# Patient Record
Sex: FEMALE | Race: WHITE | URBAN - METROPOLITAN AREA
[De-identification: names, ages, dates, MRNs, and addresses within clinical notes are randomized per-mention and may not be internally consistent; named-entity substitution may affect disease eponyms.]

---

## 2017-04-07 ENCOUNTER — TRANSFERRED RECORDS (OUTPATIENT)
Dept: HEALTH INFORMATION MANAGEMENT | Facility: CLINIC | Age: 62
End: 2017-04-07

## 2017-05-10 ENCOUNTER — TRANSFERRED RECORDS (OUTPATIENT)
Dept: HEALTH INFORMATION MANAGEMENT | Facility: CLINIC | Age: 62
End: 2017-05-10

## 2017-05-24 ENCOUNTER — TRANSFERRED RECORDS (OUTPATIENT)
Dept: HEALTH INFORMATION MANAGEMENT | Facility: CLINIC | Age: 62
End: 2017-05-24

## 2017-05-26 ENCOUNTER — TRANSFERRED RECORDS (OUTPATIENT)
Dept: HEALTH INFORMATION MANAGEMENT | Facility: CLINIC | Age: 62
End: 2017-05-26

## 2017-06-26 ENCOUNTER — TRANSFERRED RECORDS (OUTPATIENT)
Dept: HEALTH INFORMATION MANAGEMENT | Facility: CLINIC | Age: 62
End: 2017-06-26

## 2017-06-27 ENCOUNTER — CARE COORDINATION (OUTPATIENT)
Dept: TRANSPLANT | Facility: CLINIC | Age: 62
End: 2017-06-27

## 2017-07-06 ENCOUNTER — OFFICE VISIT (OUTPATIENT)
Dept: TRANSPLANT | Facility: CLINIC | Age: 62
End: 2017-07-06
Attending: INTERNAL MEDICINE
Payer: COMMERCIAL

## 2017-07-06 ENCOUNTER — MEDICAL CORRESPONDENCE (OUTPATIENT)
Dept: TRANSPLANT | Facility: CLINIC | Age: 62
End: 2017-07-06

## 2017-07-06 VITALS
TEMPERATURE: 98.5 F | HEIGHT: 61 IN | SYSTOLIC BLOOD PRESSURE: 121 MMHG | HEART RATE: 81 BPM | BODY MASS INDEX: 34.19 KG/M2 | DIASTOLIC BLOOD PRESSURE: 78 MMHG | WEIGHT: 181.1 LBS | RESPIRATION RATE: 18 BRPM | OXYGEN SATURATION: 96 %

## 2017-07-06 DIAGNOSIS — C85.90 NHL (NON-HODGKIN'S LYMPHOMA) (H): Primary | ICD-10-CM

## 2017-07-06 DIAGNOSIS — C83.18 LYMPHOMA, MANTLE CELL, MULTIPLE SITES (H): Primary | ICD-10-CM

## 2017-07-06 DIAGNOSIS — Z71.9 ENCOUNTER FOR COUNSELING: Primary | ICD-10-CM

## 2017-07-06 PROCEDURE — 40000268 ZZH STATISTIC NO CHARGES: Mod: ZF

## 2017-07-06 PROCEDURE — 99213 OFFICE O/P EST LOW 20 MIN: CPT | Mod: ZF

## 2017-07-06 RX ORDER — MULTIPLE VITAMINS W/ MINERALS TAB 9MG-400MCG
1 TAB ORAL DAILY PRN
COMMUNITY

## 2017-07-06 RX ORDER — LORATADINE 10 MG/1
10 TABLET ORAL DAILY
COMMUNITY

## 2017-07-06 ASSESSMENT — PAIN SCALES - GENERAL: PAINLEVEL: NO PAIN (0)

## 2017-07-06 NOTE — LETTER
7/6/2017       RE: Julee Ralph  [address]  [facility] [address]     Dear Colleague,    Thank you for referring your patient, Julee Ralph, to the [facility] BLOOD AND MARROW TRANSPLANT. Please see a copy of my visit note below.    Reason for consult: Referral by Dr. Tovar for diagnosis of mantle cell lymphoma in consideration for autologous stem cell transplantation.  History of present illness and review of the symptoms:  Ms. Ralph is a very pleasant 61-year-old female patient with prior history of hypertension, dyslipidemia, obesity, was in her usual state of health up until March 2017 when she developed progressive sore throat along with fullness and palpable lymphadenopathy in her neck.  She was referred to to be evaluated by ENT and was found to have enlarged tonsils for which she underwent tonsillectomy  With outside hospital pathology revealing mantle cell lymphoma/blastoid variant (over 90% Ki67 index).  Per available to me from Dr. Tovar the patient underwent CT scan of the neck on 4/11/17 which demonstrated grade 3 hypertrophy of the right tonsil and great for hypertrophy of the left tonsil. Multiple enlarged cervical lymph nodes were described bilaterally largest in level IIA  And to be measuring slightly over 2 cm in diameter.  There are also multiple other smaller enlarged lymph nodes (level III in level IV).  IHC from her initial biopsy demonstrated strong expression of CD20, cyclin D1; Ki-67 index over 95%; TP53 and c-myc were reportedly negative.  Her diagnostic labs performed on May 18, 2017 demonstrated WBCs of 7.0, hemoglobin 14.5, platelets 340 with normal beta-2 microglobulin and normal LDH.her diagnostic PET/CT scan performed on 5/24/2017 demonstrated once again soft tissue thickening in the nasopharynx with SUV of 10.2.  FDG avid lymphadenopathy was also describedmore so on the left side of her neck with corresponding cervical lymphadenopathy involving levels two, three, four and  "SUVs maxing out at 9.9..  No hypermetabolic activity was detected in chest, abdomen or pelvis.  No skeletal lesions are identified.  The patient underwent diagnostic LP on 2017 was no history evidence of involvement of her CNS by mantle cell lymphoma.  Her diagnostic echocardiogram on 2017 demonstrated ejection fraction of 77% with no wall motion abnormalities.  The patient started therapy with R CHOP alternating with RDHAP.  She has received so far one cycle each  Combination chemotherapy and has tolerated it fairly well to date except from brief period of bradycardia which recent administration of RDHAP potentially attributed to her supportive care medications. Per available records her cytarabine was partially held.  She currently reports feeling well overall with no recent fevers, chills, drenching night sweats. Also prior to her diagnosis the patient reported no constitutional symptoms including no significant fatigue. Essentially her symptoms were limited to soreness in her throat and cervical lymphadenopathy.    Past medical history:  -Hypertension  -Dyslipidemia  -Obesity  -Mantle cell lymphoma,    Past surgical history:  -Hysterectomy  -Tonsillectomy    Social history:  -Former smoker (smoked over half a pack per day for 30 years and quit about eight years ago).  -Social alcohol use  -Worsening office as   -No children  2 siblings (two sisters in their 60s one of whom has history of lupus and rheumatoid arthritis).    Medications:  -Losartan/hydrochlorothiazide  -Lipitor  -Multivitamins including vitamin D    Physical examination:  /78 (BP Location: Left arm, Patient Position: Sitting, Cuff Size: Adult Regular)  Pulse 81  Temp 98.5  F (36.9  C) (Oral)  Resp 18  Ht 1.553 m (5' 1.14\")  Wt 82.1 kg (181 lb 1.6 oz)  SpO2 96%  BMI 34.06 kg/m2  ECO  Gen.: Alert and oriented not in acute distress; well nourished and hydrated.  HEENT:  moist membranes with no ulcerations " or thrush  No palpable cervical adenopathy  Lungs: Clear  Bilaterally  Cardiovascular: Regular rate and rhythm with no murmurs  Abdomen: Soft nontender nondistended; audible bowel sounds  Extremities: No edema  Skin: No rashes  Neurologic:  Grossly nonfocal    Labs:  Reviewed outside hospital records  Estimated MIPI  Score at time of diagnosis ~ 7.7 /high risk    Imaging: see HPI  Bx: see HPI  review at [facility] pend     A/P: 62 yo female pt with newly diagnosed MCL referred for initial consultation in consideration for stem cell transportation in upfront management of her disease.    -Mantle cell lymphoma: We discussed with the patient in detail the nature history of mantle cell lymphoma with particular focus on the upfront therapy including couple of the symptoms of transportation for patient to achieve first complete remission.  The patient appears to be clinically responding to therapy delivered to date. I recommend continuing with upfront therapy for now to complete a total of six cycles of therapy (Blood. 2013 Gino 3;121(1):48-53. doi: 10.1182/[id number]. Epub 2012 Jun 20. CHOP and DHAP plus rituximab followed by autologous stem cell transplantation in mantle cell lymphoma: a phase 2 study from the [employer]. Kenton R1, Dontae C, Indu V, Calvin P, Juan Pablo A, Salena H, Sven G, Van Jm A, Merlene O, Bruno N, Myranda F, Eloise O; [employer] (MARY) alternating RCHOP with RDHAP in her case. Should the patient achieved complete remission as evidenced by end of therapy PET/CT scan I recommend proceeding with high-dose chemotherapy (conditioning with BEAM) followed by autologous stem cell transplantation at the [facility]. ASCT continues to represent a gold standard in younger fit pts with MCL who are able to achieve CR1. We then reviewed with patient in detail major logistics of stem cell transplant including but not limited to stem cell  collection, administration of high-dose chemotherapy and major posttransplant complications such as mucositis, infections, major organ toxicities from high-dose chemotherapy, nausea vomiting diarrhea, pancytopenia along with late complications of transplant. The patient  had multiple questions all of which were answered to the best of my ability.  I also recommended giving consideration to Rituxan maintenance posttransplant.  The patient will continue with her ongoing chemotherapy under supervision of Dr. Tovar. She expressed interest in proceeding with ASCT should she achieve first complete remission and will be glad to support her throughout the transplant process.  The patient also had an opportunity to meet with my nurse coordinator and  to discuss further the logistics of transplant.    I spent over 60% of this over an hour encounter in counseling care coordinator reviewing her prior history, ongoing care and future transplant plans as outlined above.    Alexsander Santamaria MD PhD  Hematology, Oncology and Transplantation  [facility]  ------------------------------------------------------------------------------------------------------------------  This note was created with the use of a speech recognition software occasionally resulting in unintended misspelling errors despite my best efforts to detect and correct those.       Again, thank you for allowing me to participate in the care of your patient.      Sincerely,    Alexsander Santamaria MD

## 2017-07-06 NOTE — PROGRESS NOTES
Met with pt and spouse, Cullen, to discuss follow up plan. Pt and spouse verbalized understanding of plan. She was given Dr Santamaria and BMT admin office contact info.

## 2017-07-06 NOTE — MR AVS SNAPSHOT
"              After Visit Summary   7/6/2017    Julee Ralph    MRN: [id number]           Patient Information     Date Of Birth          1955        Visit Information        Provider Department      7/6/2017 11:30 AM Alexsander Santamaria MD [facility] Blood and Marrow Transplant        Today's Diagnoses     Lymphoma, mantle cell, multiple sites (H)    -  1          [facility] ([facility]  [address] [place] [address]  Phone: [phone]  Clinic Hours:   Monday-Thursday:7am to 7pm   Friday: 7am to 5pm   Weekends and holidays:    8am to noon (in general)  If your fever is 100.5  or greater,   call the clinic.  After hours call the   hospital at [phone] or   [phone]. Ask for the BMT   fellow on-call            Follow-ups after your visit        Who to contact     If you have questions or need follow up information about today's clinic visit or your schedule please contact [facility] BLOOD AND MARROW TRANSPLANT directly at [phone].  Normal or non-critical lab and imaging results will be communicated to you by [employer], letter or phone within 4 business days after the clinic has received the results. If you do not hear from us within 7 days, please contact the clinic through [employer] or phone. If you have a critical or abnormal lab result, we will notify you by phone as soon as possible.  Submit refill requests through [employer] or call your pharmacy and they will forward the refill request to us. Please allow 3 business days for your refill to be completed.          Additional Information About Your Visit        [employer] Information     [employer] lets you send messages to your doctor, view your test results, renew your prescriptions, schedule appointments and more. To sign up, go to [email or web address] . Click on \"Log in\" on the left side of the screen, which will take you to the Welcome page. Then click on \"Sign up Now\" on the right side of the page.     You will be asked to " "enter the access code listed below, as well as some personal information. Please follow the directions to create your username and password.     Your access code is: [email or web address]  Expires: 2017  2:26 PM     Your access code will  in 90 days. If you need help or a new code, please call your [facility] clinic or [phone].        Care EveryWhere ID     This is your Care EveryWhere ID. This could be used by other organizations to access your [facility] medical records  [id number]        Your Vitals Were     Pulse Temperature Respirations Height Pulse Oximetry BMI (Body Mass Index)    81 98.5  F (36.9  C) (Oral) 18 1.553 m (5' 1.14\") 96% 34.06 kg/m2       Blood Pressure from Last 3 Encounters:   17 121/78    Weight from Last 3 Encounters:   17 82.1 kg (181 lb 1.6 oz)              Today, you had the following     No orders found for display       Recent Review Flowsheet Data     There is no flowsheet data to display.               Primary Care Provider    None Specified       No primary provider on file.        Equal Access to Services     [facility] : Hadrashmi Miller, gamaliel augustin, dallas starr . So [facility] [phone].    ATENCIÓN: Si habla español, tiene a dexter disposición servicios gratuitos de asistencia lingüística. Yuriame al [phone].    We comply with applicable federal civil rights laws and [place] laws. We do not discriminate on the basis of race, color, national origin, age, disability sex, sexual orientation or gender identity.            Thank you!     Thank you for choosing [facility] BLOOD AND MARROW TRANSPLANT  for your care. Our goal is always to provide you with excellent care. Hearing back from our patients is one way we can continue to improve our services. Please take a few minutes to complete the written survey that you may receive in the mail after your visit with us. Thank you!             Your Updated " Medication List - Protect others around you: Learn how to safely use, store and throw away your medicines at www.disposemymeds.org.          This list is accurate as of: 7/6/17 11:59 PM.  Always use your most recent med list.                   Brand Name Dispense Instructions for use Diagnosis    LIPITOR PO      Take 10 mg by mouth every evening        loratadine 10 MG tablet    CLARITIN     Take 10 mg by mouth daily        LOSARTAN POTASSIUM PO      Take 12.5 mg by mouth daily        Multi-vitamin Tabs tablet      Take 1 tablet by mouth daily as needed        VITAMIN D (CHOLECALCIFEROL) PO      Take 2,000 Units by mouth daily

## 2017-07-06 NOTE — PROGRESS NOTES
Reason for consult: Referral by Dr. Tovar for diagnosis of mantle cell lymphoma in consideration for autologous stem cell transplantation.  History of present illness and review of the symptoms:  Ms. Ralph is a very pleasant 61-year-old female patient with prior history of hypertension, dyslipidemia, obesity, was in her usual state of health up until March 2017 when she developed progressive sore throat along with fullness and palpable lymphadenopathy in her neck.  She was referred to to be evaluated by ENT and was found to have enlarged tonsils for which she underwent tonsillectomy  With outside hospital pathology revealing mantle cell lymphoma/blastoid variant (over 90% Ki67 index).  Per available to me from Dr. Tovar the patient underwent CT scan of the neck on 4/11/17 which demonstrated grade 3 hypertrophy of the right tonsil and great for hypertrophy of the left tonsil. Multiple enlarged cervical lymph nodes were described bilaterally largest in level IIA  And to be measuring slightly over 2 cm in diameter.  There are also multiple other smaller enlarged lymph nodes (level III in level IV).  IHC from her initial biopsy demonstrated strong expression of CD20, cyclin D1; Ki-67 index over 95%; TP53 and c-myc were reportedly negative.  Her diagnostic labs performed on May 18, 2017 demonstrated WBCs of 7.0, hemoglobin 14.5, platelets 340 with normal beta-2 microglobulin and normal LDH.her diagnostic PET/CT scan performed on 5/24/2017 demonstrated once again soft tissue thickening in the nasopharynx with SUV of 10.2.  FDG avid lymphadenopathy was also describedmore so on the left side of her neck with corresponding cervical lymphadenopathy involving levels two, three, four and SUVs maxing out at 9.9..  No hypermetabolic activity was detected in chest, abdomen or pelvis.  No skeletal lesions are identified.  The patient underwent diagnostic LP on 5/26/2017 was no history evidence of involvement of her CNS by  "mantle cell lymphoma.  Her diagnostic echocardiogram on 2017 demonstrated ejection fraction of 77% with no wall motion abnormalities.  The patient started therapy with R CHOP alternating with RDHAP.  She has received so far one cycle each  Combination chemotherapy and has tolerated it fairly well to date except from brief period of bradycardia which recent administration of RDHAP potentially attributed to her supportive care medications. Per available records her cytarabine was partially held.  She currently reports feeling well overall with no recent fevers, chills, drenching night sweats. Also prior to her diagnosis the patient reported no constitutional symptoms including no significant fatigue. Essentially her symptoms were limited to soreness in her throat and cervical lymphadenopathy.    Past medical history:  -Hypertension  -Dyslipidemia  -Obesity  -Mantle cell lymphoma,    Past surgical history:  -Hysterectomy  -Tonsillectomy    Social history:  -Former smoker (smoked over half a pack per day for 30 years and quit about eight years ago).  -Social alcohol use  -Worsening office as   -No children  2 siblings (two sisters in their 60s one of whom has history of lupus and rheumatoid arthritis).    Medications:  -Losartan/hydrochlorothiazide  -Lipitor  -Multivitamins including vitamin D    Physical examination:  /78 (BP Location: Left arm, Patient Position: Sitting, Cuff Size: Adult Regular)  Pulse 81  Temp 98.5  F (36.9  C) (Oral)  Resp 18  Ht 1.553 m (5' 1.14\")  Wt 82.1 kg (181 lb 1.6 oz)  SpO2 96%  BMI 34.06 kg/m2  ECO  Gen.: Alert and oriented not in acute distress; well nourished and hydrated.  HEENT:  moist membranes with no ulcerations or thrush  No palpable cervical adenopathy  Lungs: Clear  Bilaterally  Cardiovascular: Regular rate and rhythm with no murmurs  Abdomen: Soft nontender nondistended; audible bowel sounds  Extremities: No edema  Skin: No " rashes  Neurologic:  Grossly nonfocal    Labs:  Reviewed outside hospital records  Estimated MIPI  Score at time of diagnosis ~ 7.7 /high risk    Imaging: see HPI  Bx: see HPI  review at [facility] pend     A/P: 62 yo female pt with newly diagnosed MCL referred for initial consultation in consideration for stem cell transportation in upfront management of her disease.    -Mantle cell lymphoma: We discussed with the patient in detail the nature history of mantle cell lymphoma with particular focus on the upfront therapy including couple of the symptoms of transportation for patient to achieve first complete remission.  The patient appears to be clinically responding to therapy delivered to date. I recommend continuing with upfront therapy for now to complete a total of six cycles of therapy (Blood. 2013 Gino 3;121(1):48-53. doi: 10.1182/[id number]. Epub 2012 Jun 20. CHOP and DHAP plus rituximab followed by autologous stem cell transplantation in mantle cell lymphoma: a phase 2 study from the [employer]. Kenton R1, Dontae C, Indu V, Calvin P, Juan Pablo A, Salena H, Sven G, Van Jm A, Merlene O, Bruno N, Myranda F, Eloise O; [employer] Lymphomes de l'Adulte (MARY) alternating RCHOP with RDHAP in her case. Should the patient achieved complete remission as evidenced by end of therapy PET/CT scan I recommend proceeding with high-dose chemotherapy (conditioning with BEAM) followed by autologous stem cell transplantation at [facility]. ASCT continues to represent a gold standard in younger fit pts with MCL who are able to achieve CR1. We then reviewed with patient in detail major logistics of stem cell transplant including but not limited to stem cell collection, administration of high-dose chemotherapy and major posttransplant complications such as mucositis, infections, major organ toxicities from high-dose chemotherapy, nausea vomiting diarrhea, pancytopenia along with late  complications of transplant. The patient  had multiple questions all of which were answered to the best of my ability.  I also recommended giving consideration to Rituxan maintenance posttransplant.  The patient will continue with her ongoing chemotherapy under supervision of Dr. Tovar. She expressed interest in proceeding with ASCT should she achieve first complete remission and will be glad to support her throughout the transplant process.  The patient also had an opportunity to meet with my nurse coordinator and  to discuss further the logistics of transplant.    I spent over 60% of this over an hour encounter in counseling care coordinator reviewing her prior history, ongoing care and future transplant plans as outlined above.    Alexsander Santamaria MD PhD  Hematology, Oncology and Transplantation  [facility]  ------------------------------------------------------------------------------------------------------------------  This note was created with the use of a speech recognition software occasionally resulting in unintended misspelling errors despite my best efforts to detect and correct those.

## 2017-07-06 NOTE — MR AVS SNAPSHOT
"              After Visit Summary   7/6/2017    Julee Ralph    MRN: [id number]           Patient Information     Date Of Birth          1955        Visit Information        Provider Department      7/6/2017 1:30 PM Susan Zepeda MSW;  2 [address] [facility] Blood and Marrow Transplant        Today's Diagnoses     Encounter for counseling    -  1          [facility] ([facility])  [address] [place] [address]  Phone: [phone]  Clinic Hours:   Monday-Thursday:7am to 7pm   Friday: 7am to 5pm   Weekends and holidays:    8am to noon (in general)  If your fever is 100.5  or greater,   call the clinic.  After hours call the   hospital at [phone] or   [phone]. Ask for the BMT   fellow on-call            Follow-ups after your visit        Who to contact     If you have questions or need follow up information about today's clinic visit or your schedule please contact [facility] BLOOD AND MARROW TRANSPLANT directly at [phone].  Normal or non-critical lab and imaging results will be communicated to you by [employer], letter or phone within 4 business days after the clinic has received the results. If you do not hear from us within 7 days, please contact the clinic through "[employer]"t or phone. If you have a critical or abnormal lab result, we will notify you by phone as soon as possible.  Submit refill requests through [employer] or call your pharmacy and they will forward the refill request to us. Please allow 3 business days for your refill to be completed.          Additional Information About Your Visit        [employer] Information     [employer] lets you send messages to your doctor, view your test results, renew your prescriptions, schedule appointments and more. To sign up, go to [email or web address] . Click on \"Log in\" on the left side of the screen, which will take you to the Welcome page. Then click on \"Sign up Now\" on the right side of the page.     You will be asked " to enter the access code listed below, as well as some personal information. Please follow the directions to create your username and password.     Your access code is: [email or web address]  Expires: 2017  2:26 PM     Your access code will  in 90 days. If you need help or a new code, please call your [facility] clinic or [phone].        Care EveryWhere ID     This is your Care EveryWhere ID. This could be used by other organizations to access your [facility] medical records  [id number]         Blood Pressure from Last 3 Encounters:   17 121/78    Weight from Last 3 Encounters:   17 82.1 kg (181 lb 1.6 oz)              Today, you had the following     No orders found for display       Recent Review Flowsheet Data     There is no flowsheet data to display.               Primary Care Provider    None Specified       No primary provider on file.        Equal Access to Services     [facility]: Hadrashmi Miller, gamaliel augustin, doe minaya, dallas collado . So [facility] [phone].    ATENCIÓN: Si habla español, tiene a dexter disposición servicios gratuitos de asistencia lingüística. Llame al [phone].    We comply with applicable federal civil rights laws and [place] laws. We do not discriminate on the basis of race, color, national origin, age, disability sex, sexual orientation or gender identity.            Thank you!     Thank you for choosing [facility] BLOOD AND MARROW TRANSPLANT  for your care. Our goal is always to provide you with excellent care. Hearing back from our patients is one way we can continue to improve our services. Please take a few minutes to complete the written survey that you may receive in the mail after your visit with us. Thank you!             Your Updated Medication List - Protect others around you: Learn how to safely use, store and throw away your medicines at www.disposemymeds.org.          This list is accurate as  of: 7/6/17 11:59 PM.  Always use your most recent med list.                   Brand Name Dispense Instructions for use Diagnosis    LIPITOR PO      Take 10 mg by mouth every evening        loratadine 10 MG tablet    CLARITIN     Take 10 mg by mouth daily        LOSARTAN POTASSIUM PO      Take 12.5 mg by mouth daily        Multi-vitamin Tabs tablet      Take 1 tablet by mouth daily as needed        VITAMIN D (CHOLECALCIFEROL) PO      Take 2,000 Units by mouth daily

## 2017-07-06 NOTE — NURSING NOTE
"Oncology Rooming Note    July 6, 2017 11:46 AM   Julee Ralph is a 61 year old female who presents for:    Chief Complaint   Patient presents with     Oncology Clinic Visit     Mantle cell lymphoma - New     Initial Vitals: /78 (BP Location: Left arm, Patient Position: Sitting, Cuff Size: Adult Regular)  Pulse 81  Temp 98.5  F (36.9  C) (Oral)  Resp 18  Ht 1.553 m (5' 1.14\")  Wt 82.1 kg (181 lb 1.6 oz)  SpO2 96%  BMI 34.06 kg/m2 Estimated body mass index is 34.06 kg/(m^2) as calculated from the following:    Height as of this encounter: 1.553 m (5' 1.14\").    Weight as of this encounter: 82.1 kg (181 lb 1.6 oz). Body surface area is 1.88 meters squared.  No Pain (0) Comment: Data Unavailable   No LMP recorded. Patient has had a hysterectomy.  Allergies reviewed: Yes  Medications reviewed: Yes    Medications: Medication refills not needed today.  Pharmacy name entered into [employer] [address] [place] [address] HWY AT [facility] [address]    Clinical concerns: Patient here to see provider as new patient related to Mantle cell lymphoma    15 minutes for nursing intake (face to face time)     Wing Mcmahon LPN            "

## 2017-07-06 NOTE — MR AVS SNAPSHOT
After Visit Summary   7/6/2017    Julee Ralph    MRN: [id number]           Patient Information     Date Of Birth          1955        Visit Information        Provider Department      7/6/2017 1:00 PM Coordinator, [facility] Bmt Nurse;  2 114 CONSULT [facility] Blood and Marrow Transplant        Today's Diagnoses     NHL (non-Hodgkin's lymphoma) (H)    -  1          [facility] ([facility])  [address] [place] [address]  Phone: [phone]  Clinic Hours:   Monday-Thursday:7am to 7pm   Friday: 7am to 5pm   Weekends and holidays:    8am to noon (in general)  If your fever is 100.5  or greater,   call the clinic.  After hours call the   hospital at [phone] or   [phone]. Ask for the BMT   fellow on-call            Follow-ups after your visit        Your next 10 appointments already scheduled     Jul 06, 2017  1:30 PM CDT   (Arrive by 1:15 PM)   BMT OLLY VELEZ with JERI Berger,  2 114 CONSULT [facility] Blood and Marrow Transplant ([facility] [place] [facility])    [address] [place] [id number] [phone]              Who to contact     If you have questions or need follow up information about today's clinic visit or your schedule please contact [facility] BLOOD AND MARROW TRANSPLANT directly at [phone].  Normal or non-critical lab and imaging results will be communicated to you by MyChart, letter or phone within 4 business days after the clinic has received the results. If you do not hear from us within 7 days, please contact the clinic through MyChart or phone. If you have a critical or abnormal lab result, we will notify you by phone as soon as possible.  Submit refill requests through [employer] or call your pharmacy and they will forward the refill request to us. Please allow 3 business days for your refill to be completed.          Additional Information About Your Visit        MyChart Information      "[employer] lets you send messages to your doctor, view your test results, renew your prescriptions, schedule appointments and more. To sign up, go to [email or web address] . Click on \"Log in\" on the left side of the screen, which will take you to the Welcome page. Then click on \"Sign up Now\" on the right side of the page.     You will be asked to enter the access code listed below, as well as some personal information. Please follow the directions to create your username and password.     Your access code is: [email or web address]  Expires: 2017  2:26 PM     Your access code will  in 90 days. If you need help or a new code, please call your [place] clinic or [phone].        Care EveryWhere ID     This is your Care EveryWhere ID. This could be used by other organizations to access your [place] medical records  [id number]         Blood Pressure from Last 3 Encounters:   17 121/78    Weight from Last 3 Encounters:   17 82.1 kg (181 lb 1.6 oz)              Today, you had the following     No orders found for display       Recent Review Flowsheet Data     There is no flowsheet data to display.               Primary Care Provider    None Specified       No primary provider on file.        Equal Access to Services     ROBER GARCIA : Ewa britoo Paul, waaxda luqadaha, qaybta kaalmada adeparisayada, dallas collado . So [facility] [phone].    ATENCIÓN: Si habla español, tiene a dexter disposición servicios gratuitos de asistencia lingüística. Llame al [phone].    We comply with applicable federal civil rights laws and [place] laws. We do not discriminate on the basis of race, color, national origin, age, disability sex, sexual orientation or gender identity.            Thank you!     Thank you for choosing [facility] BLOOD AND MARROW TRANSPLANT  for your care. Our goal is always to provide you with excellent care. Hearing back from our patients is one way we can " continue to improve our services. Please take a few minutes to complete the written survey that you may receive in the mail after your visit with us. Thank you!             Your Updated Medication List - Protect others around you: Learn how to safely use, store and throw away your medicines at www.disposemymeds.org.          This list is accurate as of: 7/6/17  1:06 PM.  Always use your most recent med list.                   Brand Name Dispense Instructions for use Diagnosis    LIPITOR PO      Take 10 mg by mouth every evening        loratadine 10 MG tablet    CLARITIN     Take 10 mg by mouth daily        LOSARTAN POTASSIUM PO      Take 12.5 mg by mouth daily        Multi-vitamin Tabs tablet      Take 1 tablet by mouth daily as needed        VITAMIN D (CHOLECALCIFEROL) PO      Take 2,000 Units by mouth daily

## 2017-07-10 NOTE — PROGRESS NOTES
Blood and Marrow Transplant   New Transplant Visit with   Clinical     Assessment completed on 7/6/2017 of living situation, support system, financial status, functional status, coping, stressors, need for resources and social work intervention provided as needed. Information for this assessment was provided by pt and pt's -Cullen walker in addition to medical chart review and consultation with medical team.     Present:  Patient: Julee Ralph  Spouse: Cullen Ralph  : JERI Berger, [facility]    Medical Team   Nurse Coordinator: Bertha Calderon RN  BMT Physician: Alexsander Santamaria MD  Referring Physician: Loi Tovar MD    Presenting Information:  Pt is a 61 year old female diagnosed with Non-Hodgkin's Lymphoma (NHL). Pt presents for autologous stem cell transplant discussion.    Contact Information:  Cell Phone: [phone]    Special Needs: Pt lives in [place], [place] and will need to relocate. Pt will need local lodging. SW discussed relocation and explained in detail the different lodging options. Pt and pt's  are considering the [facility] and other extended stay hotels in the area. Pt and pt's  are in agreement with relocating.     Family Information:   Spouse: Cullen Ralph  Parents: Parents live in [place]  Siblings: 2 Sisters (1 lives in [facility] and 1 lives in [place])  Children: None    Education/Employment:  Currently employed: Yes  Employer: [employer]  Occupation:     Spouse/Partner Employed: No; pt's  is retired.  Employer: Pt's  retired from the [employer].  Occupation: Maintence    Finances/Insurance: No financial or insurance concerns identified at this time.  Source of Income: Social Security Disability and payroll    SW discussed johnny options and asked pt to let SW know if they would like to apply in the future. SW provided information regarding the insurance authorization process and  the role of the BMT financial case-mangers. SW provided contact info for the BMT financial case-managers and referred pt to them for future insurance questions. Pt's Financial  is Luz Mandy (P: [phone])    Caregiver:   LOLY discussed with the pt and pt's -Cullen the caregiver role and expectation at length. Pt is agreeable to having a full time caregiver for the minimum of 30 days until cleared by the BMT Physician. Pt's identified caregivers are her -Cullen as pt's  is retired. Caregiver education and information provided. No caregiver concerns identified.     Healthcare Directive: No. Pt shared that she has the paperwork and she plans to complete the healthcare directive.    Resources Provided:  BMT Information Book  BMT Resources Packet  Healthcare Directive  Transplant Unit Description and Information     Identified Concerns:  No concerns identified at this time.     Summary:  Pt presents to [facility] regarding an autologous stem cell transplant. Pt and pt's -Cullen asked good/appropriate questions regarding psychosocial factors related to BMT; all questions were addressed. Pt presented as calm and pleasant. Pt's affect was appropriate and engaging. Pt will need to relocate. No caregiver concerns identified.    Plan:   SW provided contact information and encouraged pt to contact SW with any additional questions, concerns, resources and/or for support. SW will continue to follow pt to provide support and guidance with resources as needed.     JERI Berger, SW  Phone: [phone]  Pager: [phone]

## 2017-10-27 ENCOUNTER — TELEPHONE (OUTPATIENT)
Dept: TRANSPLANT | Facility: CLINIC | Age: 62
End: 2017-10-27

## 2017-10-27 NOTE — TELEPHONE ENCOUNTER
Clinical   Blood and Marrow Transplant Service    Reason for Intervention: Lodging for NT and subsequent allogeneic transplant    Data: Patient will be traveling from [place], [place] with her  to her NT and then work-up for her planned allogeneic PBSCT. Julee had spoken with MARY Barajas RN and indicated that she had some concerns about lodging. We talked about [facility] including waiting list and only being able to get on the list when a patient is here and ready to take the apartment. Currently there are 6 people on the waiting list. We also talked about extended stay hotel rooms and Julee noted that they have been doing some research re: these. Her  is talking about looking on [employer] - writer indicated we would not want her to stay in a house with concerns re: mold, fungus and new construction dust. Writer also provided information on [facility] apartments/phone/address/cost per month.  Julee indicated that [facility] will not work for them. Also encouraged her to look at our website as there in information there about local lodging.    Intervention: Education    Education Provided: Local lodging options and how the [facility] work    Follow-up Required: None at this time    Encouraged patient to reach out as questions or concerns arise.     Aruna WILCOX LICSW  10/27/2017  Phone [phone]  Pager [phone]

## 2017-11-08 ENCOUNTER — ALLIED HEALTH/NURSE VISIT (OUTPATIENT)
Dept: TRANSPLANT | Facility: CLINIC | Age: 62
End: 2017-11-08
Attending: INTERNAL MEDICINE
Payer: COMMERCIAL

## 2017-11-08 ENCOUNTER — MEDICAL CORRESPONDENCE (OUTPATIENT)
Dept: TRANSPLANT | Facility: CLINIC | Age: 62
End: 2017-11-08

## 2017-11-08 VITALS
DIASTOLIC BLOOD PRESSURE: 90 MMHG | WEIGHT: 171.1 LBS | HEART RATE: 102 BPM | BODY MASS INDEX: 32.18 KG/M2 | SYSTOLIC BLOOD PRESSURE: 154 MMHG | OXYGEN SATURATION: 98 % | RESPIRATION RATE: 18 BRPM | TEMPERATURE: 98 F

## 2017-11-08 DIAGNOSIS — Z71.9 ENCOUNTER FOR COUNSELING: Primary | ICD-10-CM

## 2017-11-08 DIAGNOSIS — C83.18 LYMPHOMA, MANTLE CELL, MULTIPLE SITES (H): Primary | ICD-10-CM

## 2017-11-08 PROCEDURE — 90686 IIV4 VACC NO PRSV 0.5 ML IM: CPT | Mod: ZF | Performed by: INTERNAL MEDICINE

## 2017-11-08 PROCEDURE — G0008 ADMIN INFLUENZA VIRUS VAC: HCPCS

## 2017-11-08 PROCEDURE — 40000268 ZZH STATISTIC NO CHARGES: Mod: ZF

## 2017-11-08 PROCEDURE — 25000128 H RX IP 250 OP 636: Mod: ZF | Performed by: INTERNAL MEDICINE

## 2017-11-08 PROCEDURE — 99213 OFFICE O/P EST LOW 20 MIN: CPT | Mod: ZF

## 2017-11-08 RX ADMIN — INFLUENZA A VIRUS A/MICHIGAN/45/2015 X-275 (H1N1) ANTIGEN (FORMALDEHYDE INACTIVATED), INFLUENZA A VIRUS A/HONG KONG/4801/2014 X-263B (H3N2) ANTIGEN (FORMALDEHYDE INACTIVATED), INFLUENZA B VIRUS B/PHUKET/3073/2013 ANTIGEN (FORMALDEHYDE INACTIVATED), AND INFLUENZA B VIRUS B/BRISBANE/60/2008 ANTIGEN (FORMALDEHYDE INACTIVATED) 0.5 ML: 15; 15; 15; 15 INJECTION, SUSPENSION INTRAMUSCULAR at 10:19

## 2017-11-08 ASSESSMENT — PAIN SCALES - GENERAL: PAINLEVEL: NO PAIN (0)

## 2017-11-08 NOTE — PROGRESS NOTES
Met with patient and  after new evaluation with Dr Cuba regarding plan and BMT nurse coordinator role. Provided patient with Luís Barajas's contact information for future questions and plan. Patient verbalized understanding of provided information.

## 2017-11-08 NOTE — MR AVS SNAPSHOT
After Visit Summary   11/8/2017    Julee Ralph    MRN: [id number]           Patient Information     Date Of Birth          1955        Visit Information        Provider Department      11/8/2017 10:30 AM Coordinator, [facility] Bmt Nurse;  2 118 CONSULT [facility] Blood and Marrow Transplant        Today's Diagnoses     Lymphoma, mantle cell, multiple sites (H)    -  1          [facility] ([facility])  [address] [place] [address]  Phone: [phone]  Clinic Hours:   Monday-Thursday:7am to 7pm   Friday: 7am to 5pm   Weekends and holidays:    8am to noon (in general)  If your fever is 100.5  or greater,   call the clinic.  After hours call the   hospital at [phone] or   [phone]. Ask for the BMT   fellow on-call            Follow-ups after your visit        Who to contact     If you have questions or need follow up information about today's clinic visit or your schedule please contact [facility] BLOOD AND MARROW TRANSPLANT directly at [phone].  Normal or non-critical lab and imaging results will be communicated to you by [employer], letter or phone within 4 business days after the clinic has received the results. If you do not hear from us within 7 days, please contact the clinic through [employer] or phone. If you have a critical or abnormal lab result, we will notify you by phone as soon as possible.  Submit refill requests through [employer] or call your pharmacy and they will forward the refill request to us. Please allow 3 business days for your refill to be completed.          Additional Information About Your Visit        [employer] Information     [employer] gives you secure access to your electronic health record. If you see a primary care provider, you can also send messages to your care team and make appointments. If you have questions, please call your primary care clinic.  If you do not have a primary care provider, please call [phone] and they will assist  you.        Care EveryWhere ID     This is your Care EveryWhere ID. This could be used by other organizations to access your [place] medical records  [id number]         Blood Pressure from Last 3 Encounters:   11/08/17 154/90   07/06/17 121/78    Weight from Last 3 Encounters:   11/08/17 77.6 kg (171 lb 1.6 oz)   07/06/17 82.1 kg (181 lb 1.6 oz)              Today, you had the following     No orders found for display       Recent Review Flowsheet Data     There is no flowsheet data to display.               Primary Care Provider Office Phone # Fax #    Rebekah Arevalo [phone]       [facility] [address] [facility] [address]        Equal Access to Services     ROBER GARCIA : Hadii aad ku hadasho Soomaali, waaxda luqadaha, qaybta kaalmada adeegyada, waxay idiin hayaan harlan kharaolivier collado . So [facility] [phone].    ATENCIÓN: Si habla español, tiene a dexter disposición servicios gratuitos de asistencia lingüística. Llame al [phone].    We comply with applicable federal civil rights laws and [place] laws. We do not discriminate on the basis of race, color, national origin, age, disability, sex, sexual orientation, or gender identity.            Thank you!     Thank you for choosing [facility] BLOOD AND MARROW TRANSPLANT  for your care. Our goal is always to provide you with excellent care. Hearing back from our patients is one way we can continue to improve our services. Please take a few minutes to complete the written survey that you may receive in the mail after your visit with us. Thank you!             Your Updated Medication List - Protect others around you: Learn how to safely use, store and throw away your medicines at www.disposemymeds.org.          This list is accurate as of: 11/8/17 11:47 AM.  Always use your most recent med list.                   Brand Name Dispense Instructions for use Diagnosis    ALLOPURINOL PO      Take 300 mg by mouth daily         LIPITOR PO      Take 10 mg by mouth every evening        loratadine 10 MG tablet    CLARITIN     Take 10 mg by mouth daily        LOSARTAN POTASSIUM PO      Take 12.5 mg by mouth daily        Multi-vitamin Tabs tablet      Take 1 tablet by mouth daily as needed        VITAMIN D (CHOLECALCIFEROL) PO      Take 2,000 Units by mouth daily

## 2017-11-08 NOTE — PROGRESS NOTES
[facility]  New Outpatient Clinic Note  Outpatient Progress Note    Hem/onc History:   Ms. Ralph is a 62 o woman with a history of stage II blastoid-variant mantle cell lymphoma, s/p Tx with 6 cycles of alternating R-CHOP and R-DHAP followed by CR, who presents now for consideration of autologous SCT.     1.)  Patient had been in her usual state of health until She was referred to to be evaluated by ENT and was found to have enlarged tonsils for which she underwent tonsillectomy. No constitutional symptoms, no fatigue.  With outside hospital pathology revealing mantle cell lymphoma/blastoid variant (over 90% Ki67 index).  4/11/17 CT revealed grade 3 hypertrophy of the right tonsil and grade 4 hypertrophy of the left tonsil with multiple enlarged cervical lymp nodes bilaterally; Surgical pathology 05/04/17 revealed blastic mantle cell bilaterally; IHC from her initial biopsy demonstrated strong expression of CD20, cyclin D1; Ki-67 index over 95%; TP53 and c-myc were reportedly negative.Initial labs 05/18/17: WBC 7.0, Hgb 14.5, Plt 340, , Uric acid 2.0, B2MG 2.59, EBV VCA IgG + NAG IgG positive; IgM negative; HBV negative.   PET/CT 05/24/17: soft tissue thickening in the nasopharynx/pre-cervical space with increased metabolic activity and SUV max of 10.2.  Bilateral cervical LNs in levels 2,3,4.  No hypermetabolic activity in the chest, abdomen,pelvis.    BMBx 05/26/17: Normocellular bone marrow with 40% cellularity; normal cytogenetics.  The patient underwent diagnostic LP on 5/26/2017 was no history evidence of involvement of her CNS by mantle cell lymphoma.  TTE 05/31/17: LVEF 77%, no RWMAs.   2.) Evaluated by Dr. Santamaria 07/06/17 for consideration of autologous stem cell transplantation.    3.) The patient started therapy with R-CHOP alternating with R-DHAP.  C1D1 was 06/01/2017.  C2 complicated by grade 1 sinus bradycardia, grade 2 hyperuricemia treated with rasburicase.  Cycle 3 and 4  without complication (C4D1 08/04/17).  Cycle 5 delayed due to neutropenic fever.  Cycle #5 09/08/17; C #6 09/29/17.  Anemic during C6, catalino Hgb 6.2.    4.) PET/CTs 08/01/2017 and 10/17/2017 showed interval resolution of all hypermetabolic regions.      Interval history:   Feeling well as this time, no focal or constitutional symptoms following completion of Tx recently.  Port site without complications.    Denies  fevers, chills, NS, HA, dysphagia/odynophagia, change in weight, change in appetite, cough, SOB, CP, n/v, abd pain, constipation/diarrhea, hematochezia, dysuria, hematuria, swelling, rashes, lymphadenopathy  Past Medical History:   Past medical history:  -Hypertension  -Dyslipidemia  -Obesity  -Mantle cell lymphoma    Past Surgical History:   Past surgical history:  -Hysterectomy  -Tonsillectomy    Social History:   Social history:  -Former smoker (smoked over half a pack per day for 30 years and quit about eight years ago).  -Social alcohol use  -Worsening office as   -No children  2 siblings (two sisters in their 60s one of whom has history of lupus and rheumatoid arthritis).      Family History:   No history of lymhoma      Medications:   Medications reviewed with patient and as noted with the following exceptions:      Current Outpatient Prescriptions   Medication Sig     ALLOPURINOL PO Take 300 mg by mouth daily     VITAMIN D, CHOLECALCIFEROL, PO Take 2,000 Units by mouth daily     loratadine (CLARITIN) 10 MG tablet Take 10 mg by mouth daily     multivitamin, therapeutic with minerals (MULTI-VITAMIN) TABS tablet Take 1 tablet by mouth daily as needed      LOSARTAN POTASSIUM PO Take 12.5 mg by mouth daily     Atorvastatin Calcium (LIPITOR PO) Take 10 mg by mouth every evening     No current facility-administered medications for this visit.             Physical Exam:   There were no vitals taken for this visit.    ECOG PS: 0  Constitutional: WDWN female in NAD, pleasant and  appropriate  HEENT:  NC/AT, no icterus, OP clear, MMM  Skin: No jaundice nor ecchymoses. Port site not currently accessed, C/D/I.   Lungs: CTAB, no w/r/r, nonlabored breathing  Cardiovascular: RRR, S1, S2, no m/r/g  Abdomen: +BS, soft, nontender, nondistended, no organomegaly nor masses  MSK/Extremities: Warm, well perfused. No edema  LN: no cervical, supraclavicular, axillary, nor inguinal lymphadenopathy  Neurologic: alert, answering questions appropriately, moving all extremities spontaneously  Psych: appropriate affect  Data:   No results for input(s): WBC, NEUTROPHIL, HGB, PLT in the last 89055 hours.  No results for input(s): NA, POTASSIUM, CHLORIDE, CO2, ANIONGAP, BUN, CR, ANDREIA in the last 41062 hours.  No results for input(s): MAG, PHOS, LDH, URIC in the last 05164 hours.  No results for input(s): BILITOTAL, ALKPHOS, ALT, AST, ALBUMIN, LDH in the last 54566 hours.    No results found for this or any previous visit (from the past 24 hour(s)).    Assessment and Plan:     Impression: 62 year old woman with mantle cell lymphoma, s/p 6 cycles of R-CHOP/R-DHAP, achieving CR1.  Now here for consideration of autologous SCT.  Logistics of SCT reviewed in detail today.  Will plan for BEAM conditioning, followed by auto-SCT, and Rituxan maintenance for 2-3 years (Angel et al ; 13, 2017).         Labs, imaging and treatment plan reviewed with patient. All questions answered.    In this minutes visit, > 50% spent in counseling and coordinating care.    Patient discussed with Dr. Bereket Schrader MD, PhD   Hem/Onc Fellow    I saw and examined this patient with Dr. Gareth Schrader, our heme/onc fellow.      In summary, Ms. Ralph is a 62-year-old patient with mantle cell lymphoma, stage IA at diagnosis.  High-risk features include a high Ki-67 and also her age.  The patient had a complete remission with 6 cycles of chemotherapy alternating R-CHOP and R-DHAP.  She was here today to review and the  rationale and logistics of an autologous stem cell transplant consolidation.      We again reviewed with the patient the natural history and recommended treatment for mantle cell lymphoma.  Most centers consider the induction therapy followed by autologous transplant consolidation, followed by rituximab maintenance as the standard of care for high risk mantle cell lymphoma.  Alternatively, the patient could consider no autologous transplantation, but we would still recommend maintenance therapy with rituximab for 2-3 years.     We also reviewed the rationale behind autologous transplant which the active treatment is the high doses of chemotherapy, with the autologous peripheral blood stem cells being a supportive measure to accelerate the recovery.  The white cell count recovery typically occurs around 14 days post transplant.      We then reviewed with the patient the process of getting to autologous transplant starting by the pre-transplant evaluation to determine organ function and the disease status followed by the collection of autologous stem cells followed by the conditioning regimen and then the reinfusion of stem cells and the recovery period. We described to the patient the process of collecting the stem cells and the utilization of a large-bore central venous line for the leukapheresis.  We also talked about the most common and frequently used supportive measures including but not limited to, growth factors, blood products, IV fluids, IV nutrition and antibiotics.  We also talked about the most serious and frequent complications of transplantation including but not limited to, infections, bleeding, clotting, organ damage, disease relapse and secondary malignancies.  Overall, we expect 97% of patients surviving the first 6 months with the 2% mortality being mostly related to infection with a rare case of fatal organ toxicity.      The patient and her  had questions that were answered to their  satisfaction.  At this time, the patient and her  feel that they are ready and they have a good understanding of the rationale and the process and will be working with our office to determine the best logistics with her work for the necessary time off to undergo the procedure.      Thank you very much for referring the patient and allowing me to participate in her care.  Please feel free to contact me with any questions at [phone].

## 2017-11-08 NOTE — MR AVS SNAPSHOT
After Visit Summary   11/8/2017    Julee Ralph    MRN: [id number]           Patient Information     Date Of Birth          1955        Visit Information        Provider Department      11/8/2017 11:00 AM Susan Zepeda MSW;  2 118 CONSULT [facility] Blood and Marrow Transplant        Today's Diagnoses     Encounter for counseling    -  1          [facility] ([facility])  [address] [place] [address]  Phone: [phone]  Clinic Hours:   Monday-Thursday:7am to 7pm   Friday: 7am to 5pm   Weekends and holidays:    8am to noon (in general)  If your fever is 100.5  or greater,   call the clinic.  After hours call the   hospital at [phone] or   [phone]. Ask for the BMT   fellow on-call            Follow-ups after your visit        Who to contact     If you have questions or need follow up information about today's clinic visit or your schedule please contact [facility] BLOOD AND MARROW TRANSPLANT directly at [phone].  Normal or non-critical lab and imaging results will be communicated to you by [employer], letter or phone within 4 business days after the clinic has received the results. If you do not hear from us within 7 days, please contact the clinic through [employer] or phone. If you have a critical or abnormal lab result, we will notify you by phone as soon as possible.  Submit refill requests through [employer] or call your pharmacy and they will forward the refill request to us. Please allow 3 business days for your refill to be completed.          Additional Information About Your Visit        SCYNEXIShart Information     [employer] gives you secure access to your electronic health record. If you see a primary care provider, you can also send messages to your care team and make appointments. If you have questions, please call your primary care clinic.  If you do not have a primary care provider, please call [phone] and they will assist you.        Care  EveryWhere ID     This is your Care EveryWhere ID. This could be used by other organizations to access your [place] medical records  [id number]         Blood Pressure from Last 3 Encounters:   11/08/17 154/90   07/06/17 121/78    Weight from Last 3 Encounters:   11/08/17 77.6 kg (171 lb 1.6 oz)   07/06/17 82.1 kg (181 lb 1.6 oz)              Today, you had the following     No orders found for display       Recent Review Flowsheet Data     There is no flowsheet data to display.               Primary Care Provider Office Phone # Fax #    Rebekah Arevalo [phone]       [facility] [address]  [place] [address]        Equal Access to Services     ROBER GARCIA : Hadii aad ku hadasho Soomaali, waaxda luqadaha, qaybta kaalmada adeegyada, waxay idiin haykellyn harlan collado . So [facility] [phone].    ATENCIÓN: Si habla español, tiene a dexter disposición servicios gratuitos de asistencia lingüística. [facility] [phone].    We comply with applicable federal civil rights laws and [place] laws. We do not discriminate on the basis of race, color, national origin, age, disability, sex, sexual orientation, or gender identity.            Thank you!     Thank you for choosing [facility] BLOOD AND MARROW TRANSPLANT  for your care. Our goal is always to provide you with excellent care. Hearing back from our patients is one way we can continue to improve our services. Please take a few minutes to complete the written survey that you may receive in the mail after your visit with us. Thank you!             Your Updated Medication List - Protect others around you: Learn how to safely use, store and throw away your medicines at www.disposemymeds.org.          This list is accurate as of: 11/8/17 11:59 PM.  Always use your most recent med list.                   Brand Name Dispense Instructions for use Diagnosis    ALLOPURINOL PO      Take 300 mg by mouth daily        LIPITOR PO       Take 10 mg by mouth every evening        loratadine 10 MG tablet    CLARITIN     Take 10 mg by mouth daily        LOSARTAN POTASSIUM PO      Take 12.5 mg by mouth daily        Multi-vitamin Tabs tablet      Take 1 tablet by mouth daily as needed        VITAMIN D (CHOLECALCIFEROL) PO      Take 2,000 Units by mouth daily

## 2017-11-08 NOTE — MR AVS SNAPSHOT
After Visit Summary   11/8/2017    Julee Ralph    MRN: [id number]           Patient Information     Date Of Birth          1955        Visit Information        Provider Department      11/8/2017 9:00 AM Dwayne Cuba MD [facility] Blood and Marrow Transplant        Today's Diagnoses     Lymphoma, mantle cell, multiple sites (H)    -  1          [facility]  [address] [place] [address]  Phone: [phone]  Clinic Hours:   Monday-Thursday:7am to 7pm   Friday: 7am to 5pm   Weekends and holidays:    8am to noon (in general)  If your fever is 100.5  or greater,   call the clinic.  After hours call the   hospital at [phone] or   [phone]. Ask for the BMT   fellow on-call            Follow-ups after your visit        Who to contact     If you have questions or need follow up information about today's clinic visit or your schedule please contact [facility] BLOOD AND MARROW TRANSPLANT directly at [phone].  Normal or non-critical lab and imaging results will be communicated to you by [employer]astyhart, letter or phone within 4 business days after the clinic has received the results. If you do not hear from us within 7 days, please contact the clinic through [employer] or phone. If you have a critical or abnormal lab result, we will notify you by phone as soon as possible.  Submit refill requests through [employer] or call your pharmacy and they will forward the refill request to us. Please allow 3 business days for your refill to be completed.          Additional Information About Your Visit        AB Tastyhart Information     [employer] gives you secure access to your electronic health record. If you see a primary care provider, you can also send messages to your care team and make appointments. If you have questions, please call your primary care clinic.  If you do not have a primary care provider, please call [phone] and they will assist you.        Care  EveryWhere ID     This is your Care EveryWhere ID. This could be used by other organizations to access your [place] medical records  [id number]        Your Vitals Were     Pulse Temperature Respirations Pulse Oximetry BMI (Body Mass Index)       102 98  F (36.7  C) (Oral) 18 98% 32.18 kg/m2        Blood Pressure from Last 3 Encounters:   11/08/17 154/90   07/06/17 121/78    Weight from Last 3 Encounters:   11/08/17 77.6 kg (171 lb 1.6 oz)   07/06/17 82.1 kg (181 lb 1.6 oz)              Today, you had the following     No orders found for display       Recent Review Flowsheet Data     There is no flowsheet data to display.               Primary Care Provider Office Phone # Fax #    Rebekah Arevalo [phone]       [facility] [address]  [place] [address]        Equal Access to Services     [facility] : Hadii aad ku hadasho Soomaali, waaxda luqadaha, qaybta kaalmada adeegyada, waxay idiin hayaan adeparisa collado . So [facility] [phone].    ATENCIÓN: Si habla español, tiene a dexter disposición servicios gratuitos de asistencia lingüística. Ana al [phone].    We comply with applicable federal civil rights laws and [place] laws. We do not discriminate on the basis of race, color, national origin, age, disability, sex, sexual orientation, or gender identity.            Thank you!     Thank you for choosing [facility] BLOOD AND MARROW TRANSPLANT  for your care. Our goal is always to provide you with excellent care. Hearing back from our patients is one way we can continue to improve our services. Please take a few minutes to complete the written survey that you may receive in the mail after your visit with us. Thank you!             Your Updated Medication List - Protect others around you: Learn how to safely use, store and throw away your medicines at www.disposemymeds.org.          This list is accurate as of: 11/8/17 11:59 PM.  Always use your most recent med  list.                   Brand Name Dispense Instructions for use Diagnosis    ALLOPURINOL PO      Take 300 mg by mouth daily        LIPITOR PO      Take 10 mg by mouth every evening        loratadine 10 MG tablet    CLARITIN     Take 10 mg by mouth daily        LOSARTAN POTASSIUM PO      Take 12.5 mg by mouth daily        Multi-vitamin Tabs tablet      Take 1 tablet by mouth daily as needed        VITAMIN D (CHOLECALCIFEROL) PO      Take 2,000 Units by mouth daily

## 2017-11-08 NOTE — NURSING NOTE

## 2017-11-08 NOTE — NURSING NOTE
"Oncology Rooming Note    November 8, 2017 9:01 AM   Julee Ralph is a 62 year old female who presents for:    Chief Complaint   Patient presents with     RECHECK     Pre BMT for MCL here for provider     Initial Vitals: /90  Pulse 102  Temp 98  F (36.7  C) (Oral)  Resp 18  Wt 77.6 kg (171 lb 1.6 oz)  SpO2 98%  BMI 32.18 kg/m2 Estimated body mass index is 32.18 kg/(m^2) as calculated from the following:    Height as of 7/6/17: 1.553 m (5' 1.14\").    Weight as of this encounter: 77.6 kg (171 lb 1.6 oz). Body surface area is 1.83 meters squared.  No Pain (0) Comment: Data Unavailable   No LMP recorded. Patient has had a hysterectomy.  Allergies reviewed: Yes  Medications reviewed: Yes    Medications: Medication refills not needed today.  Pharmacy name entered into [employer]: [facility] [address] [place] [address] ANN MARIE GIFFORD AT [facility] [address]    Clinical concerns: NA NA was NOT notified.    10 minutes for nursing intake (face to face time)     Ese Sousa CMA              "

## 2017-11-08 NOTE — LETTER
11/8/2017      RE: Julee Ralph  [address]  [facility] [address]       [facility] [place]  New Outpatient Clinic Note  Outpatient Progress Note    Hem/onc History:   Ms. Ralph is a 62 o woman with a history of stage II blastoid-variant mantle cell lymphoma, s/p Tx with 6 cycles of alternating R-CHOP and R-DHAP followed by CR, who presents now for consideration of autologous SCT.     1.)  Patient had been in her usual state of health until She was referred to to be evaluated by ENT and was found to have enlarged tonsils for which she underwent tonsillectomy. No constitutional symptoms, no fatigue.  With outside hospital pathology revealing mantle cell lymphoma/blastoid variant (over 90% Ki67 index).  4/11/17 CT revealed grade 3 hypertrophy of the right tonsil and grade 4 hypertrophy of the left tonsil with multiple enlarged cervical lymp nodes bilaterally; Surgical pathology 05/04/17 revealed blastic mantle cell bilaterally; IHC from her initial biopsy demonstrated strong expression of CD20, cyclin D1; Ki-67 index over 95%; TP53 and c-myc were reportedly negative.Initial labs 05/18/17: WBC 7.0, Hgb 14.5, Plt 340, , Uric acid 2.0, B2MG 2.59, EBV VCA IgG + NAG IgG positive; IgM negative; HBV negative.   PET/CT 05/24/17: soft tissue thickening in the nasopharynx/pre-cervical space with increased metabolic activity and SUV max of 10.2.  Bilateral cervical LNs in levels 2,3,4.  No hypermetabolic activity in the chest, abdomen,pelvis.    BMBx 05/26/17: Normocellular bone marrow with 40% cellularity; normal cytogenetics.  The patient underwent diagnostic LP on 5/26/2017 was no history evidence of involvement of her CNS by mantle cell lymphoma.  TTE 05/31/17: LVEF 77%, no RWMAs.   2.) Evaluated by Dr. Santamaria 07/06/17 for consideration of autologous stem cell transplantation.    3.) The patient started therapy with R-CHOP alternating with R-DHAP.   C1D1 was 06/01/2017.  C2 complicated by grade 1 sinus  bradycardia, grade 2 hyperuricemia treated with rasburicase.  Cycle 3 and 4 without complication (C4D1 08/04/17).  Cycle 5 delayed due to neutropenic fever.  Cycle #5 09/08/17; C #6 09/29/17.  Anemic during C6, catalino Hgb 6.2.    4.) PET/CTs 08/01/2017 and 10/17/2017 showed interval resolution of all hypermetabolic regions.      Interval history:   Feeling well as this time, no focal or constitutional symptoms following completion of Tx recently.  Port site without complications.    Denies  fevers, chills, NS, HA, dysphagia/odynophagia, change in weight, change in appetite, cough, SOB, CP, n/v, abd pain, constipation/diarrhea, hematochezia, dysuria, hematuria, swelling, rashes, lymphadenopathy  Past Medical History:   Past medical history:  -Hypertension  -Dyslipidemia  -Obesity  -Mantle cell lymphoma    Past Surgical History:   Past surgical history:  -Hysterectomy  -Tonsillectomy    Social History:   Social history:  -Former smoker (smoked over half a pack per day for 30 years and quit about eight years ago).  -Social alcohol use  -Worsening office as   -No children  2 siblings (two sisters in their 60s one of whom has history of lupus and rheumatoid arthritis).      Family History:   No history of lymhoma      Medications:   Medications reviewed with patient and as noted with the following exceptions:      Current Outpatient Prescriptions   Medication Sig     ALLOPURINOL PO Take 300 mg by mouth daily     VITAMIN D, CHOLECALCIFEROL, PO Take 2,000 Units by mouth daily     loratadine (CLARITIN) 10 MG tablet Take 10 mg by mouth daily     multivitamin, therapeutic with minerals (MULTI-VITAMIN) TABS tablet Take 1 tablet by mouth daily as needed      LOSARTAN POTASSIUM PO Take 12.5 mg by mouth daily     Atorvastatin Calcium (LIPITOR PO) Take 10 mg by mouth every evening     No current facility-administered medications for this visit.             Physical Exam:   There were no vitals taken for this  visit.    ECOG PS: 0  Constitutional: WDWN female in NAD, pleasant and appropriate  HEENT:  NC/AT, no icterus, OP clear, MMM  Skin: No jaundice nor ecchymoses. Port site not currently accessed, C/D/I.   Lungs: CTAB, no w/r/r, nonlabored breathing  Cardiovascular: RRR, S1, S2, no m/r/g  Abdomen: +BS, soft, nontender, nondistended, no organomegaly nor masses  MSK/Extremities: Warm, well perfused. No edema  LN: no cervical, supraclavicular, axillary, nor inguinal lymphadenopathy  Neurologic: alert, answering questions appropriately, moving all extremities spontaneously  Psych: appropriate affect  Data:   No results for input(s): WBC, NEUTROPHIL, HGB, PLT in the last 99764 hours.  No results for input(s): NA, POTASSIUM, CHLORIDE, CO2, ANIONGAP, BUN, CR, ANDREIA in the last 13651 hours.  No results for input(s): MAG, PHOS, LDH, URIC in the last 23861 hours.  No results for input(s): BILITOTAL, ALKPHOS, ALT, AST, ALBUMIN, LDH in the last 66815 hours.    No results found for this or any previous visit (from the past 24 hour(s)).    Assessment and Plan:     Impression: 62 year old woman with mantle cell lymphoma, s/p 6 cycles of R-CHOP/R-DHAP, achieving CR1.  Now here for consideration of autologous SCT.  Logistics of SCT reviewed in detail today.  Will plan for BEAM conditioning, followed by auto-SCT, and Rituxan maintenance for 2-3 years (Angel et al ; 13, 2017).         Labs, imaging and treatment plan reviewed with patient. All questions answered.    In this minutes visit, > 50% spent in counseling and coordinating care.    Patient discussed with Dr. Bereket Schrader MD, PhD   Hem/Onc Fellow    I saw and examined this patient with Dr. Gareth Schrader, our heme/onc fellow.      In summary, Ms. Ralph is a 62-year-old patient with mantle cell lymphoma, stage IA at diagnosis.  High-risk features include a high Ki-67 and also her age.  The patient had a complete remission with 6 cycles of chemotherapy  alternating R-CHOP and R-DHAP.  She was here today to review and the rationale and logistics of an autologous stem cell transplant consolidation.      We again reviewed with the patient the natural history and recommended treatment for mantle cell lymphoma.  Most centers consider the induction therapy followed by autologous transplant consolidation, followed by rituximab maintenance as the standard of care for high risk mantle cell lymphoma.  Alternatively, the patient could consider no autologous transplantation, but we would still recommend maintenance therapy with rituximab for 2-3 years.     We also reviewed the rationale behind autologous transplant which the active treatment is the high doses of chemotherapy, with the autologous peripheral blood stem cells being a supportive measure to accelerate the recovery.  The white cell count recovery typically occurs around 14 days post transplant.      We then reviewed with the patient the process of getting to autologous transplant starting by the pre-transplant evaluation to determine organ function and the disease status followed by the collection of autologous stem cells followed by the conditioning regimen and then the reinfusion of stem cells and the recovery period. We described to the patient the process of collecting the stem cells and the utilization of a large-bore central venous line for the leukapheresis.  We also talked about the most common and frequently used supportive measures including but not limited to, growth factors, blood products, IV fluids, IV nutrition and antibiotics.  We also talked about the most serious and frequent complications of transplantation including but not limited to, infections, bleeding, clotting, organ damage, disease relapse and secondary malignancies.  Overall, we expect 97% of patients surviving the first 6 months with the 2% mortality being mostly related to infection with a rare case of fatal organ toxicity.      The  patient and her  had questions that were answered to their satisfaction.  At this time, the patient and her  feel that they are ready and they have a good understanding of the rationale and the process and will be working with our office to determine the best logistics with her work for the necessary time off to undergo the procedure.      Thank you very much for referring the patient and allowing me to participate in her care.  Please feel free to contact me with any questions at [phone].       Dwayne Cuba MD

## 2017-11-09 PROCEDURE — 00000346 ZZHCL STATISTIC REVIEW OUTSIDE SLIDES TC 88321: Performed by: INTERNAL MEDICINE

## 2017-11-09 PROCEDURE — 00000345 ZZHCL STATISTIC REV BONE MARROW OUTSIDE SLIDES TC 88321: Performed by: INTERNAL MEDICINE

## 2017-11-10 DIAGNOSIS — C85.90 NON-HODGKIN'S LYMPHOMA (H): ICD-10-CM

## 2017-11-10 DIAGNOSIS — Z86.2 PERSONAL HISTORY OF DISEASES OF BLOOD AND BLOOD-FORMING ORGANS: ICD-10-CM

## 2017-11-10 NOTE — PROGRESS NOTES
Blood and Marrow Transplant   REPEAT New Transplant Visit with   Clinical      REPEAT Assessment completed on 11/8/2017 of living situation, support system, financial status, functional status, coping, stressors, need for resources and social work intervention provided as needed. Information for this assessment was provided by pt and pt's -Cullen walker in addition to medical chart review and consultation with medical team.      Present:  Patient: Julee Ralph  Spouse: Cullen Ralph  : JERI Berger, [facility]     Medical Team   Nurse Coordinator: Lidia Morales RN  BMT Physician: Dwayne Cuba MD  Referring Physician: Loi Tovar MD     Presenting Information:  Pt is a 61 year old female diagnosed with Non-Hodgkin's Lymphoma (NHL). Pt presents for autologous stem cell transplant discussion.     Contact Information:  Cell Phone: [phone]     Special Needs: Pt lives in [place], [place] and will need to relocate. Pt will need local lodging. SW discussed relocation and explained in detail the different lodging options. Pt and pt's  prefer to stay at the [facility] and would like to get on the waiting list during work-up week for the [facility]. If ,pt and pt's  are not able to get into the [facility] right away, they will stay at a extended stay hotel. SW provided information on extended stay hotels near the hospital.     Family Information:   Spouse: Cullen Ralph  Parents: Mother-Davida and Father-Av (Live in [place])  Siblings: Sister-Rosio (Lives in [facility]) and Sister-Lexy (Lives in [place])  Children: None     Education/Employment:  Currently employed: Yes  Employer: [employer]  Occupation:   * Pt shared that her employer has been flexible and she has FMLA available.    Spouse/Partner Employed: No; pt's  is retired.  Employer: Pt's  retired from the [employer].  Occupation:  Maintence     Finances/Insurance: No financial or insurance concerns identified at this time.    Source of Income: Social Security [employer] and Payroll     SW discussed johnny options and asked pt to let SW know if they would like to apply in the future. SW provided information regarding the insurance authorization process and the role of the BMT financial case-mangers. SW provided contact info for the BMT financial case-managers and referred pt to them for future insurance questions. Pt's Financial  is Luz Galvan (P: [phone]).     Caregiver:   SW discussed with the pt and pt's -Cullen the caregiver role and expectation at length. Pt is agreeable to having a full time caregiver for the minimum of 30 days until cleared by the BMT Physician. Pt's identified caregivers are her -Cullen as pt's  is retired. Caregiver education and information provided. No caregiver concerns identified.      Healthcare Directive: No. Pt shared that she has the paperwork and she plans to complete the healthcare directive.     Resources Provided:  BMT Information Book  BMT Resources Packet  Healthcare Directive  Transplant Unit Description and Information      Important Information:  -Pt and pt's  would like to get on the [facility] Wait List during work-up week.    Summary:  Pt presents to [facility] regarding an autologous stem cell transplant. Pt and pt's -Cullen asked good/appropriate questions regarding psychosocial factors related to BMT; all questions were addressed. Pt presented as calm and pleasant. Pt's affect was appropriate and engaging. Pt will need to relocate. No caregiver concerns identified.     Plan:   SW provided contact information and encouraged pt to contact SW with any additional questions, concerns, resources and/or for support. SW will continue to follow pt to provide support and guidance with resources as needed.      Susan  JERI Zepeda, [facility]  Phone: [phone]  Pager: [phone]

## 2017-11-13 LAB
COPATH REPORT: NORMAL
COPATH REPORT: NORMAL

## 2017-11-29 ENCOUNTER — HOSPITAL ENCOUNTER (OUTPATIENT)
Dept: GENERAL RADIOLOGY | Facility: CLINIC | Age: 62
Discharge: HOME | End: 2017-11-29
Attending: INTERNAL MEDICINE | Admitting: INTERNAL MEDICINE
Payer: COMMERCIAL

## 2017-11-29 ENCOUNTER — RESULTS ONLY (OUTPATIENT)
Dept: OTHER | Facility: CLINIC | Age: 62
End: 2017-11-29

## 2017-11-29 ENCOUNTER — OFFICE VISIT (OUTPATIENT)
Dept: TRANSPLANT | Facility: CLINIC | Age: 62
End: 2017-11-29
Attending: INTERNAL MEDICINE
Payer: COMMERCIAL

## 2017-11-29 ENCOUNTER — HOSPITAL ENCOUNTER (OUTPATIENT)
Dept: NUCLEAR MEDICINE | Facility: CLINIC | Age: 62
Setting detail: NUCLEAR MEDICINE
End: 2017-11-29
Attending: INTERNAL MEDICINE
Payer: COMMERCIAL

## 2017-11-29 VITALS
RESPIRATION RATE: 16 BRPM | SYSTOLIC BLOOD PRESSURE: 126 MMHG | BODY MASS INDEX: 31.04 KG/M2 | TEMPERATURE: 95.9 F | HEIGHT: 62 IN | OXYGEN SATURATION: 98 % | DIASTOLIC BLOOD PRESSURE: 73 MMHG | HEART RATE: 104 BPM | WEIGHT: 168.7 LBS

## 2017-11-29 DIAGNOSIS — C85.90 NON-HODGKIN'S LYMPHOMA (H): ICD-10-CM

## 2017-11-29 DIAGNOSIS — Z86.2 PERSONAL HISTORY OF DISEASES OF BLOOD AND BLOOD-FORMING ORGANS: ICD-10-CM

## 2017-11-29 LAB
ABO + RH BLD: NORMAL
ABO + RH BLD: NORMAL
ALBUMIN SERPL-MCNC: 3.6 G/DL (ref 3.4–5)
ALBUMIN UR-MCNC: NEGATIVE MG/DL
ALP SERPL-CCNC: 89 U/L (ref 40–150)
ALT SERPL W P-5'-P-CCNC: 27 U/L (ref 0–50)
ANION GAP SERPL CALCULATED.3IONS-SCNC: 9 MMOL/L (ref 3–14)
APPEARANCE UR: ABNORMAL
APTT PPP: 28 SEC (ref 22–37)
AST SERPL W P-5'-P-CCNC: 21 U/L (ref 0–45)
B2 MICROGLOB SERPL-MCNC: 6.5 MG/L
BASOPHILS # BLD AUTO: 0 10E9/L (ref 0–0.2)
BASOPHILS NFR BLD AUTO: 0.6 %
BILIRUB SERPL-MCNC: 0.3 MG/DL (ref 0.2–1.3)
BILIRUB UR QL STRIP: NEGATIVE
BLD GP AB SCN SERPL QL: NORMAL
BLOOD BANK CMNT PATIENT-IMP: NORMAL
BUN SERPL-MCNC: 45 MG/DL (ref 7–30)
CALCIUM SERPL-MCNC: 9.3 MG/DL (ref 8.5–10.1)
CHLORIDE SERPL-SCNC: 109 MMOL/L (ref 94–109)
CO2 SERPL-SCNC: 24 MMOL/L (ref 20–32)
COLOR UR AUTO: YELLOW
CREAT SERPL-MCNC: 1.69 MG/DL (ref 0.52–1.04)
DIFFERENTIAL METHOD BLD: ABNORMAL
EBV VCA IGG SER QL IA: >8 AI (ref 0–0.8)
EOSINOPHIL # BLD AUTO: 0.2 10E9/L (ref 0–0.7)
EOSINOPHIL NFR BLD AUTO: 6.4 %
ERYTHROCYTE [DISTWIDTH] IN BLOOD BY AUTOMATED COUNT: 14.3 % (ref 10–15)
GFR SERPL CREATININE-BSD FRML MDRD: 30 ML/MIN/1.7M2
GLUCOSE SERPL-MCNC: 104 MG/DL (ref 70–99)
GLUCOSE UR STRIP-MCNC: NEGATIVE MG/DL
HCT VFR BLD AUTO: 27.9 % (ref 35–47)
HGB BLD-MCNC: 9.3 G/DL (ref 11.7–15.7)
HGB UR QL STRIP: NEGATIVE
HSV1 IGG SERPL QL IA: 0.2 AI (ref 0–0.8)
HSV2 IGG SERPL QL IA: >8 AI (ref 0–0.8)
IMM GRANULOCYTES # BLD: 0 10E9/L (ref 0–0.4)
IMM GRANULOCYTES NFR BLD: 0.8 %
INR PPP: 0.96 (ref 0.86–1.14)
KETONES UR STRIP-MCNC: NEGATIVE MG/DL
LDH SERPL L TO P-CCNC: 186 U/L (ref 81–234)
LEUKOCYTE ESTERASE UR QL STRIP: ABNORMAL
LYMPHOCYTES # BLD AUTO: 1.2 10E9/L (ref 0.8–5.3)
LYMPHOCYTES NFR BLD AUTO: 33.8 %
MCH RBC QN AUTO: 34.2 PG (ref 26.5–33)
MCHC RBC AUTO-ENTMCNC: 33.3 G/DL (ref 31.5–36.5)
MCV RBC AUTO: 103 FL (ref 78–100)
MONOCYTES # BLD AUTO: 0.5 10E9/L (ref 0–1.3)
MONOCYTES NFR BLD AUTO: 14.5 %
NEUTROPHILS # BLD AUTO: 1.6 10E9/L (ref 1.6–8.3)
NEUTROPHILS NFR BLD AUTO: 43.9 %
NITRATE UR QL: NEGATIVE
NRBC # BLD AUTO: 0 10*3/UL
NRBC BLD AUTO-RTO: 0 /100
PH UR STRIP: 5 PH (ref 5–7)
PHOSPHATE SERPL-MCNC: 4.2 MG/DL (ref 2.5–4.5)
PLATELET # BLD AUTO: 147 10E9/L (ref 150–450)
POTASSIUM SERPL-SCNC: 3.8 MMOL/L (ref 3.4–5.3)
PROT SERPL-MCNC: 6.6 G/DL (ref 6.8–8.8)
RBC # BLD AUTO: 2.72 10E12/L (ref 3.8–5.2)
RBC #/AREA URNS AUTO: 1 /HPF (ref 0–2)
SODIUM SERPL-SCNC: 142 MMOL/L (ref 133–144)
SOURCE: ABNORMAL
SP GR UR STRIP: 1.01 (ref 1–1.03)
SPECIMEN EXP DATE BLD: NORMAL
SQUAMOUS #/AREA URNS AUTO: 2 /HPF (ref 0–1)
T3FREE SERPL-MCNC: 3 PG/ML (ref 2.3–4.2)
T4 FREE SERPL-MCNC: 0.82 NG/DL (ref 0.76–1.46)
TSH SERPL DL<=0.005 MIU/L-ACNC: 3.15 MU/L (ref 0.4–4)
URATE SERPL-MCNC: 4.2 MG/DL (ref 2.6–6)
UROBILINOGEN UR STRIP-MCNC: 0 MG/DL (ref 0–2)
WBC # BLD AUTO: 3.6 10E9/L (ref 4–11)
WBC #/AREA URNS AUTO: 3 /HPF (ref 0–2)

## 2017-11-29 PROCEDURE — 78472 GATED HEART PLANAR SINGLE: CPT

## 2017-11-29 PROCEDURE — 34300033 ZZH RX 343: Performed by: INTERNAL MEDICINE

## 2017-11-29 PROCEDURE — 25000128 H RX IP 250 OP 636: Performed by: INTERNAL MEDICINE

## 2017-11-29 PROCEDURE — A9560 TC99M LABELED RBC: HCPCS | Performed by: INTERNAL MEDICINE

## 2017-11-29 RX ORDER — HEPARIN SODIUM (PORCINE) LOCK FLUSH IV SOLN 100 UNIT/ML 100 UNIT/ML
500 SOLUTION INTRAVENOUS ONCE
Status: COMPLETED | OUTPATIENT
Start: 2017-11-29 | End: 2017-11-29

## 2017-11-29 RX ORDER — HEPARIN SODIUM (PORCINE) LOCK FLUSH IV SOLN 100 UNIT/ML 100 UNIT/ML
5 SOLUTION INTRAVENOUS EVERY 8 HOURS
Status: DISCONTINUED | OUTPATIENT
Start: 2017-11-29 | End: 2017-11-29 | Stop reason: HOSPADM

## 2017-11-29 RX ADMIN — SODIUM CHLORIDE, PRESERVATIVE FREE 5 ML: 5 INJECTION INTRAVENOUS at 08:07

## 2017-11-29 RX ADMIN — Medication 26.2 MCI.: at 09:53

## 2017-11-29 RX ADMIN — SODIUM CHLORIDE, PRESERVATIVE FREE 500 UNITS: 5 INJECTION INTRAVENOUS at 09:53

## 2017-11-29 ASSESSMENT — PAIN SCALES - GENERAL: PAINLEVEL: NO PAIN (0)

## 2017-11-29 NOTE — MR AVS SNAPSHOT
After Visit Summary   11/29/2017    Julee Ralph    MRN: [id number]           Patient Information     Date Of Birth          1955        Visit Information        Provider Department      11/29/2017 8:00 AM [facility] Bmt Nurse [facility] Blood and Marrow Transplant        Today's Diagnoses     Non-Hodgkin's lymphoma (H)        Personal history of diseases of blood and blood-forming organs              [facility] ([facility])  [address] [place] [address]  Phone: [phone]  Clinic Hours:   Monday-Thursday:7am to 7pm   Friday: 7am to 5pm   Weekends and holidays:    8am to noon (in general)  If your fever is 100.5  or greater,   call the clinic.  After hours call the   hospital at [phone] or   [phone]. Ask for the BMT   fellow on-call            Follow-ups after your visit        Your next 10 appointments already scheduled     Nov 29, 2017 12:15 PM CST   NM HEART MUGA REST with [id number]   [facility], Nuclear Medicine ([facility])    [address] [place] [address] [phone]           Please bring a list of your medicines to the exam. (Include vitamins, minerals and over-the-counter drugs.) You should wear comfortable clothes. Leave your valuables at home. Please bring related prior results and films.  Tell your doctor:   If you are breastfeeding or may be pregnant.   If you have had a barium test within the past few days. Barium may change the results of certain exams.   If you think you may need sedation (medicine to help you relax).  You may eat and drink as normal.  Please call your Imaging Department at your exam site with any questions.            Nov 29, 2017  1:30 PM CST   XR CHEST 2 VIEWS with UUXR3   [facility],  Radiology ([facility])    [address] [place] [address] [phone]           Please bring a list of your  current medicines to your exam. (Include vitamins, minerals and over-thecounter medicines.) Leave your valuables at home.  Tell your doctor if there is a chance you may be pregnant.  You do not need to do anything special for this exam.            Nov 30, 2017  8:15 AM CST   PET ONCOLOGY WHOLE BODY with UUPET1   [facility] [place] PET CT ([facility])    [address] [facility] [address] [phone]           Tell your doctor:   If there is any chance you may be pregnant or if you are breastfeeding.   If you have problems lying in small spaces (claustrophobia). If you do, your doctor may give you medicine to help you relax. If you have diabetes:   Have your exam early in the morning. Your blood glucose will go up as the day goes by.   Your glucose level must be 180 or less at the start of the exam. Please take any medicines you need to ensure this blood glucose level. 24 hours before your scan: Don t do any heavy exercise. (No jogging, aerobics or other workouts.) Exercise will make your pictures less accurate. 6 hours before your scan:   Stop all food and liquids (except water).   Do not chew gum or suck on mints.   If you need to take medicine with food, you may take it with a few crackers.  Please call your Imaging Department at your exam site with any questions.            Nov 30, 2017 11:00 AM CST   Masonic Lab Draw with  MASONIC LAB DRAW   [facility] Masonic Lab Draw ([facility])    [address] [place] [address]   [phone]            Nov 30, 2017 11:30 AM CST   Bone Marrow Biopsy with  BMT JOANIE #3, UU BONE MARROW BIOPSY   [facility] Blood and Marrow Transplant ([facility])    [address] [place] [address]   [phone]            Nov 30, 2017 12:30 PM CST   BMT Nurse Visit with  Bmt Nurse 1   [facility] Blood and Marrow Transplant ([facility]  [facility] [place])    [address] [facility] [address] [phone]            Dec 01, 2017  9:00 AM CST   (Arrive by 8:45 AM)   Autologous Consultation with SON APHERESSHIRA RN8, SON 2 161 APHERESIS CON   [facility] [place] Apheres[facility] ([facility])    [address] [id number] [phone]            Dec 01, 2017 10:30 AM CST   (Arrive by 10:15 AM)   BMT SW PSA with Aruna Madrigal [facility] Blood and Marrow Transplant ([facility])    [address] [id number] [phone]              Who to contact     If you have questions or need follow up information about today's clinic visit or your schedule please contact [facility] BLOOD AND MARROW TRANSPLANT directly at [phone].  Normal or non-critical lab and imaging results will be communicated to you by [employer], letter or phone within 4 business days after the clinic has received the results. If you do not hear from us within 7 days, please contact the clinic through X[employer]ron[employer] or phone. If you have a critical or abnormal lab result, we will notify you by phone as soon as possible.  Submit refill requests through [employer] or call your pharmacy and they will forward the refill request to us. Please allow 3 business days for your refill to be completed.          Additional Information About Your Visit        [employer] Information     [employer] gives you secure access to your electronic health record. If you see a primary care provider, you can also send messages to your care team and make appointments. If you have questions, please call your primary care clinic.  If you do not have a primary care provider, please call [phone] and they will assist you.        Care EveryWhere ID     This is your Care EveryWhere ID. This could be used by other organizations to access your [facility]  "records  [id number]        Your Vitals Were     Pulse Temperature Respirations Height Pulse Oximetry BMI (Body Mass Index)    104 95.9  F (35.5  C) 16 1.575 m (5' 2\") 98% 30.86 kg/m2       Blood Pressure from Last 3 Encounters:   11/29/17 126/73   11/08/17 154/90   07/06/17 121/78    Weight from Last 3 Encounters:   11/29/17 76.5 kg (168 lb 11.2 oz)   11/08/17 77.6 kg (171 lb 1.6 oz)   07/06/17 82.1 kg (181 lb 1.6 oz)              We Performed the Following     ABO/Rh type and screen     Auto BMR Freeze     Beta 2 microglobuline     BMT Infectious Disease Donor Panel- SEND TO [facility]     CBC with platelets differential     Comprehensive metabolic panel     EBV Capsid Antibody IgG     HBV HCV HIV WNV by CATHERINE- SEND TO [facility]     Hemoglobin S     Herpes Simplex Virus 1 and 2 IgG     HLA Typing Complete BMT Recipient     INR     Lactate Dehydrogenase     Order if 2004-24: Protein electrophoresis     Partial thromboplastin time     Phosphorus     Protein Immunofixation Serum     Routine UA with microscopic     T3 Free     T4 free     TSH     Uric acid        Recent Review Flowsheet Data     BMT Recent Results Latest Ref Rng & Units 11/29/2017    WBC 4.0 - 11.0 10e9/L 3.6(L)    Hemoglobin 11.7 - 15.7 g/dL 9.3(L)    Platelet Count 150 - 450 10e9/L 147(L)    Neutrophils (Absolute) 1.6 - 8.3 10e9/L 1.6    INR 0.86 - 1.14 0.96    MCV 78 - 100 fl 103(H)               Primary Care Provider Office Phone # Fax #    Rebekah MAIER Aamircarlottajanelmarlyn [phone]       [facility] [address]  [place] [address]        Equal Access to Services     ROBER GARCIA AH: Ewa Miller, walou lughulam, qabonnie kaalmada neoda, dallas blackburn. [facility] [phone].    ATENCIÓN: Si habla español, tiene a dexter disposición servicios gratuitos de asistencia lingüística. Llkaren al [phone].    We comply with applicable federal civil rights laws and " [place] laws. We do not discriminate on the basis of race, color, national origin, age, disability, sex, sexual orientation, or gender identity.            Thank you!     Thank you for choosing [facility] BLOOD AND MARROW TRANSPLANT  for your care. Our goal is always to provide you with excellent care. Hearing back from our patients is one way we can continue to improve our services. Please take a few minutes to complete the written survey that you may receive in the mail after your visit with us. Thank you!             Your Updated Medication List - Protect others around you: Learn how to safely use, store and throw away your medicines at www.disposemymeds.org.          This list is accurate as of: 11/29/17  8:58 AM.  Always use your most recent med list.                   Brand Name Dispense Instructions for use Diagnosis    ALLOPURINOL PO      Take 300 mg by mouth daily        LIPITOR PO      Take 10 mg by mouth every evening        loratadine 10 MG tablet    CLARITIN     Take 10 mg by mouth daily        LOSARTAN POTASSIUM PO      Take 12.5 mg by mouth daily        Multi-vitamin Tabs tablet      Take 1 tablet by mouth daily as needed        VITAMIN D (CHOLECALCIFEROL) PO      Take 2,000 Units by mouth daily

## 2017-11-29 NOTE — NURSING NOTE
BMT Teaching Flowsheet    Julee Ralph is a 62 year old female  Diagnoses of Non-Hodgkin's lymphoma (H) and Personal history of diseases of blood and blood-forming organs were pertinent to this visit.    Teaching Topic: bmt work up    Person(s) involved in teaching: Patient and Spouse  Motivation Level  Asks Questions: Yes  Eager to Learn: Yes  Cooperative: Yes  Receptive (willing/able to accept information): Yes  Any cultural factors/Baptism beliefs that may influence understanding or compliance? No    Patient and Family demonstrates understanding of the following:  - Reason for the appointment, diagnosis and treatment plan: Yes  - Knowledge of proper use of medications and conditions for which they are ordered (with special attention to potential side effects or drug interactions): Yes  - Which situations necessitate calling provider and whom to contact: Yes    Teaching concerns addressed: signed consents, reviewed and updated med list, allergy assessment, smoking assessment and abuse screening, reviewed bmt work up calendar including discussion of all tests and procedures associated with work up, ua collected, labs drawn from portacath which was left accessed for use in imaging,     Proper use and care of (medical equipment, care aids, etc.) Yes  Pain management techniques: NA  Patient instructed on hand hygiene: Yes  How and/when to access community resources: NA    Infection Control:  Patient and Family demonstrates understanding of the following:  Surgical procedure site care taught NA  Signs and symptoms of infection taught NA  Wound care taught NA  Central venous catheter care taught Yes    Instructional Materials Used/Given: bmt work up calendar and map    Time spent with patient: 45 minutes.    Specific Concerns: NA

## 2017-11-30 ENCOUNTER — HOSPITAL ENCOUNTER (OUTPATIENT)
Dept: PET IMAGING | Facility: CLINIC | Age: 62
Discharge: HOME | End: 2017-11-30
Attending: INTERNAL MEDICINE | Admitting: INTERNAL MEDICINE
Payer: COMMERCIAL

## 2017-11-30 ENCOUNTER — HOSPITAL ENCOUNTER (OUTPATIENT)
Dept: PET IMAGING | Facility: CLINIC | Age: 62
End: 2017-11-30
Attending: INTERNAL MEDICINE
Payer: COMMERCIAL

## 2017-11-30 ENCOUNTER — OFFICE VISIT (OUTPATIENT)
Dept: TRANSPLANT | Facility: CLINIC | Age: 62
End: 2017-11-30
Attending: INTERNAL MEDICINE
Payer: COMMERCIAL

## 2017-11-30 VITALS — HEART RATE: 70 BPM | TEMPERATURE: 98.2 F | DIASTOLIC BLOOD PRESSURE: 77 MMHG | SYSTOLIC BLOOD PRESSURE: 123 MMHG

## 2017-11-30 DIAGNOSIS — C83.18 LYMPHOMA, MANTLE CELL, MULTIPLE SITES (H): ICD-10-CM

## 2017-11-30 DIAGNOSIS — C83.18 LYMPHOMA, MANTLE CELL, MULTIPLE SITES (H): Primary | ICD-10-CM

## 2017-11-30 DIAGNOSIS — Z86.2 PERSONAL HISTORY OF DISEASES OF BLOOD AND BLOOD-FORMING ORGANS: ICD-10-CM

## 2017-11-30 DIAGNOSIS — C85.90 NON-HODGKIN'S LYMPHOMA (H): ICD-10-CM

## 2017-11-30 LAB
ALBUMIN SERPL ELPH-MCNC: 4 G/DL (ref 3.7–5.1)
ALPHA1 GLOB SERPL ELPH-MCNC: 0.3 G/DL (ref 0.2–0.4)
ALPHA2 GLOB SERPL ELPH-MCNC: 0.7 G/DL (ref 0.5–0.9)
AUTO BMR FREEZE: NORMAL
B-GLOBULIN SERPL ELPH-MCNC: 0.7 G/DL (ref 0.6–1)
BASOPHILS # BLD AUTO: 0 10E9/L (ref 0–0.2)
BASOPHILS NFR BLD AUTO: 0.6 %
DIFFERENTIAL METHOD BLD: ABNORMAL
EOSINOPHIL # BLD AUTO: 0.2 10E9/L (ref 0–0.7)
EOSINOPHIL NFR BLD AUTO: 6 %
ERYTHROCYTE [DISTWIDTH] IN BLOOD BY AUTOMATED COUNT: 14.2 % (ref 10–15)
GAMMA GLOB SERPL ELPH-MCNC: 0.4 G/DL (ref 0.7–1.6)
GLUCOSE BLDC GLUCOMTR-MCNC: 94 MG/DL (ref 70–99)
HCT VFR BLD AUTO: 26.8 % (ref 35–47)
HGB BLD-MCNC: 9 G/DL (ref 11.7–15.7)
HLA TYPING COMPLETE BMT RECIPIENT: NORMAL
IGA SERPL-MCNC: 72 MG/DL (ref 70–380)
IGG SERPL-MCNC: 456 MG/DL (ref 695–1620)
IGM SERPL-MCNC: 9 MG/DL (ref 60–265)
IMM GRANULOCYTES # BLD: 0 10E9/L (ref 0–0.4)
IMM GRANULOCYTES NFR BLD: 0.3 %
LAB SCANNED RESULT: NORMAL
LYMPHOCYTES # BLD AUTO: 1.2 10E9/L (ref 0.8–5.3)
LYMPHOCYTES NFR BLD AUTO: 38.2 %
M PROTEIN SERPL ELPH-MCNC: 0 G/DL
MCH RBC QN AUTO: 33.8 PG (ref 26.5–33)
MCHC RBC AUTO-ENTMCNC: 33.6 G/DL (ref 31.5–36.5)
MCV RBC AUTO: 101 FL (ref 78–100)
MONOCYTES # BLD AUTO: 0.3 10E9/L (ref 0–1.3)
MONOCYTES NFR BLD AUTO: 10 %
NEUTROPHILS # BLD AUTO: 1.4 10E9/L (ref 1.6–8.3)
NEUTROPHILS NFR BLD AUTO: 44.9 %
NRBC # BLD AUTO: 0 10*3/UL
NRBC BLD AUTO-RTO: 0 /100
PLATELET # BLD AUTO: 136 10E9/L (ref 150–450)
PROT PATTERN SERPL ELPH-IMP: ABNORMAL
PROT PATTERN SERPL IFE-IMP: ABNORMAL
RBC # BLD AUTO: 2.66 10E12/L (ref 3.8–5.2)
WBC # BLD AUTO: 3.2 10E9/L (ref 4–11)

## 2017-11-30 PROCEDURE — 82962 GLUCOSE BLOOD TEST: CPT

## 2017-11-30 PROCEDURE — 88161 CYTOPATH SMEAR OTHER SOURCE: CPT | Performed by: INTERNAL MEDICINE

## 2017-11-30 PROCEDURE — 85025 COMPLETE CBC W/AUTO DIFF WBC: CPT | Performed by: INTERNAL MEDICINE

## 2017-11-30 PROCEDURE — 88311 DECALCIFY TISSUE: CPT | Performed by: INTERNAL MEDICINE

## 2017-11-30 PROCEDURE — 88185 FLOWCYTOMETRY/TC ADD-ON: CPT | Performed by: INTERNAL MEDICINE

## 2017-11-30 PROCEDURE — 88313 SPECIAL STAINS GROUP 2: CPT | Performed by: INTERNAL MEDICINE

## 2017-11-30 PROCEDURE — 40000611 ZZHCL STATISTIC MORPHOLOGY W/INTERP HEMEPATH TC 85060: Performed by: INTERNAL MEDICINE

## 2017-11-30 PROCEDURE — 40000424 ZZHCL STATISTIC BONE MARROW CORE PERF TC 38221: Performed by: INTERNAL MEDICINE

## 2017-11-30 PROCEDURE — 88280 CHROMOSOME KARYOTYPE STUDY: CPT | Performed by: INTERNAL MEDICINE

## 2017-11-30 PROCEDURE — 88264 CHROMOSOME ANALYSIS 20-25: CPT | Performed by: INTERNAL MEDICINE

## 2017-11-30 PROCEDURE — 40001004 ZZHCL STATISTIC FLOW INT 9-15 ABY TC 88188: Performed by: INTERNAL MEDICINE

## 2017-11-30 PROCEDURE — 78816 PET IMAGE W/CT FULL BODY: CPT | Mod: PS

## 2017-11-30 PROCEDURE — 88237 TISSUE CULTURE BONE MARROW: CPT | Performed by: INTERNAL MEDICINE

## 2017-11-30 PROCEDURE — 34300033 ZZH RX 343: Performed by: INTERNAL MEDICINE

## 2017-11-30 PROCEDURE — 00000058 ZZHCL STATISTIC BONE MARROW ASP PERF TC 38220: Performed by: INTERNAL MEDICINE

## 2017-11-30 PROCEDURE — 25000128 H RX IP 250 OP 636: Performed by: INTERNAL MEDICINE

## 2017-11-30 PROCEDURE — 70490 CT SOFT TISSUE NECK W/O DYE: CPT

## 2017-11-30 PROCEDURE — 40000951 ZZHCL STATISTIC BONE MARROW INTERP TC 85097: Performed by: INTERNAL MEDICINE

## 2017-11-30 PROCEDURE — 25000128 H RX IP 250 OP 636: Mod: ZF | Performed by: NURSE PRACTITIONER

## 2017-11-30 PROCEDURE — 88184 FLOWCYTOMETRY/ TC 1 MARKER: CPT | Performed by: INTERNAL MEDICINE

## 2017-11-30 PROCEDURE — 00000161 ZZHCL STATISTIC H-SPHEME PROCESS B/S: Performed by: INTERNAL MEDICINE

## 2017-11-30 PROCEDURE — G0364 BONE MARROW ASPIRATE &BIOPSY: HCPCS | Mod: ZF

## 2017-11-30 PROCEDURE — 81261 IGH GENE REARRANGE AMP METH: CPT | Performed by: INTERNAL MEDICINE

## 2017-11-30 PROCEDURE — 93005 ELECTROCARDIOGRAM TRACING: CPT | Mod: ZF

## 2017-11-30 PROCEDURE — 88271 CYTOGENETICS DNA PROBE: CPT | Performed by: INTERNAL MEDICINE

## 2017-11-30 PROCEDURE — 93010 ELECTROCARDIOGRAM REPORT: CPT | Mod: ZP | Performed by: INTERNAL MEDICINE

## 2017-11-30 PROCEDURE — 88305 TISSUE EXAM BY PATHOLOGIST: CPT | Performed by: INTERNAL MEDICINE

## 2017-11-30 PROCEDURE — 25000128 H RX IP 250 OP 636: Mod: ZF | Performed by: PHYSICIAN ASSISTANT

## 2017-11-30 PROCEDURE — 40000803 ZZHCL STATISTIC DNA ISOL HIGH PURITY: Performed by: INTERNAL MEDICINE

## 2017-11-30 PROCEDURE — 88275 CYTOGENETICS 100-300: CPT | Performed by: INTERNAL MEDICINE

## 2017-11-30 PROCEDURE — 74176 CT ABD & PELVIS W/O CONTRAST: CPT

## 2017-11-30 PROCEDURE — A9552 F18 FDG: HCPCS | Performed by: INTERNAL MEDICINE

## 2017-11-30 PROCEDURE — 38221 DX BONE MARROW BIOPSIES: CPT | Mod: ZF

## 2017-11-30 RX ORDER — HEPARIN SODIUM (PORCINE) LOCK FLUSH IV SOLN 100 UNIT/ML 100 UNIT/ML
5 SOLUTION INTRAVENOUS EVERY 8 HOURS
Status: DISCONTINUED | OUTPATIENT
Start: 2017-11-30 | End: 2017-11-30 | Stop reason: HOSPADM

## 2017-11-30 RX ORDER — HEPARIN SODIUM (PORCINE) LOCK FLUSH IV SOLN 100 UNIT/ML 100 UNIT/ML
5 SOLUTION INTRAVENOUS ONCE
Status: COMPLETED | OUTPATIENT
Start: 2017-11-30 | End: 2017-11-30

## 2017-11-30 RX ADMIN — FLUDEOXYGLUCOSE F-18 10.57 MCI.: 500 INJECTION, SOLUTION INTRAVENOUS at 08:16

## 2017-11-30 RX ADMIN — MIDAZOLAM 2 MG: 1 INJECTION INTRAMUSCULAR; INTRAVENOUS at 11:38

## 2017-11-30 RX ADMIN — SODIUM CHLORIDE, PRESERVATIVE FREE 5 ML: 5 INJECTION INTRAVENOUS at 11:43

## 2017-11-30 RX ADMIN — SODIUM CHLORIDE, PRESERVATIVE FREE 5 ML: 5 INJECTION INTRAVENOUS at 08:39

## 2017-11-30 ASSESSMENT — PAIN SCALES - GENERAL: PAINLEVEL: NO PAIN (0)

## 2017-11-30 NOTE — NURSING NOTE
Pt supine for 30 minutes post BMBx.  Vital signs obtained - see flowsheet.  Dressing clean/dry/intact.  Pt ambulatory and accompanied home by .

## 2017-11-30 NOTE — MR AVS SNAPSHOT
After Visit Summary   11/30/2017    Julee Ralph    MRN: [id number]           Patient Information     Date Of Birth          1955        Visit Information        Provider Department      11/30/2017 11:30 AM UU BONE MARROW BIOPSY;  BMT JOANIE #3 [facility] Blood and Marrow Transplant        Today's Diagnoses     Lymphoma, mantle cell, multiple sites (H)    -  1    Personal history of diseases of blood and blood-forming organs              [facility] ([facility])  [address] [place] [address]  Phone: [phone]  Clinic Hours:   Monday-Thursday:7am to 7pm   Friday: 7am to 5pm   Weekends and holidays:    8am to noon (in general)  If your fever is 100.5  or greater,   call the clinic.  After hours call the   hospital at [phone] or   [phone]. Ask for the BMT   fellow on-call            Follow-ups after your visit        Your next 10 appointments already scheduled     Nov 30, 2017 12:30 PM CST   BMT Nurse Visit with  Bmt Nurse 1   [facility] Blood [facility]row [facility]plant ([facility])    [address] [place] [id number] [phone]            Dec 01, 2017  9:00 AM CST   (Arrive by 8:45 AM)   Autologous Consultation with  APHERESIS RN8,  2 161 APHERESIS CON   [facility] ([facility])    [address] [place] [address] [id number] [phone]            Dec 01, 2017 10:30 AM CST   (Arrive by 10:15 AM)   BMT SW PSA with JERI Lynn,  2 118 CONSULT [facility] Blood and Marrow Transplant ([facility])    [address] [place] [address] [id number] [phone]            Dec 01, 2017  1:00 PM CST   FULL PULMONARY FUNCTION with  PFL D   [facility] Pulmonary Function Testing ([facility])    [address] [place] [address]  3rd [facility] [id number] [phone]             Dec 05, 2017 10:30 AM CST   BMT NURSE COORD WITH ROOM with  Bmt Nurse Coordinator,  2 114 CONSULT [facility] Blood and Marrow Transplant ([facility])    [address] [facility]  2nd Floor  [facility] [address] [phone]            Dec 05, 2017  1:15 PM CST   Central Venous Catheter - [address] [place] [address] with Shivani Flores RN,  2 117 CONSULT [facility], Patient [facility] ([facility])    [address] [facility] [address]              Appointment is located at [address] [place] [address]              Future tests that were ordered for you today     Open Future Orders        Priority Expected Expires Ordered    CT Soft Tissue Neck w Contrast Routine  11/30/2018 11/30/2017            Who to contact     If you have questions or need follow up information about today's clinic visit or your schedule please contact [facility] BLOOD AND MARROW TRANSPLANT directly at [phone].  Normal or non-critical lab and imaging results will be communicated to you by ZoomSaferhart, letter or phone within 4 business days after the clinic has received the results. If you do not hear from us within 7 days, please contact the clinic through Cute Attack or phone. If you have a critical or abnormal lab result, we will notify you by phone as soon as possible.  Submit refill requests through Cute Attack or call your pharmacy and they will forward the refill request to us. Please allow 3 business days for your refill to be completed.          Additional Information About Your Visit        Cute Attack Information     Cute Attack gives you secure access to your electronic health record. If you see a primary care provider, you can also send messages to your care team and make appointments. If you have questions, please call your primary care clinic.  If you do not have a primary care provider, please call [phone] and they will  assist you.        Care EveryWhere ID     This is your Care EveryWhere ID. This could be used by other organizations to access your [facility] medical records  [id number]        Your Vitals Were     Pulse Temperature                105 98.2  F (36.8  C)           Blood Pressure from Last 3 Encounters:   11/30/17 136/74   11/29/17 126/73   11/08/17 154/90    Weight from Last 3 Encounters:   11/29/17 76.5 kg (168 lb 11.2 oz)   11/08/17 77.6 kg (171 lb 1.6 oz)   07/06/17 82.1 kg (181 lb 1.6 oz)              We Performed the Following     B cell gene rearrangement IgH PCR - Bone Marrow     Bone Marrow Aspirate (Charge)     Bone Marrow Biopsy (Charge)     Bone marrow biopsy     CBC with platelets differential     CHROMOSOME BONE MARROW With Professional Interpretation     FISH With Professional Interpretation     Leukemia Lymphoma Evaluation (Flow Cytometry)        Recent Review Flowsheet Data     BMT Recent Results Latest Ref Rng & Units 11/29/2017 11/30/2017    WBC 4.0 - 11.0 10e9/L 3.6(L) 3.2(L)    Hemoglobin 11.7 - 15.7 g/dL 9.3(L) 9.0(L)    Platelet Count 150 - 450 10e9/L 147(L) 136(L)    Neutrophils (Absolute) 1.6 - 8.3 10e9/L 1.6 1.4(L)    INR 0.86 - 1.14 0.96 -    Sodium 133 - 144 mmol/L 142 -    Potassium 3.4 - 5.3 mmol/L 3.8 -    Chloride 94 - 109 mmol/L 109 -    Glucose 70 - 99 mg/dL 104(H) -    Urea Nitrogen 7 - 30 mg/dL 45(H) -    Creatinine 0.52 - 1.04 mg/dL 1.69(H) -    Calcium (Total) 8.5 - 10.1 mg/dL 9.3 -    Protein (Total) 6.8 - 8.8 g/dL 6.6(L) -    Albumin 3.4 - 5.0 g/dL 3.6 -    Alkaline Phosphatase 40 - 150 U/L 89 -    AST 0 - 45 U/L 21 -    ALT 0 - 50 U/L 27 -    MCV 78 - 100 fl 103(H) 101(H)               Primary Care Provider Office Phone # Fax #    Rebekah Arevalo [phone]       [facility] [address] [facility]  [place] [address]        Equal Access to Services     ROBER GARCIA : Ewa Miller, gamaliel augustin, doe minaya,  dallas cisnerosandressa patiño'aan ah. So [facility] [phone].    ATENCIÓN: Si habla campos, tiene a dexter disposición servicios gratuitos de asistencia lingüística. Aan al [phone].    We comply with applicable federal civil rights laws and [place] laws. We do not discriminate on the basis of race, color, national origin, age, disability, sex, sexual orientation, or gender identity.            Thank you!     Thank you for choosing [facility] BLOOD AND MARROW TRANSPLANT  for your care. Our goal is always to provide you with excellent care. Hearing back from our patients is one way we can continue to improve our services. Please take a few minutes to complete the written survey that you may receive in the mail after your visit with us. Thank you!             Your Updated Medication List - Protect others around you: Learn how to safely use, store and throw away your medicines at www.disposemymeds.org.          This list is accurate as of: 11/30/17 12:19 PM.  Always use your most recent med list.                   Brand Name Dispense Instructions for use Diagnosis    ALLOPURINOL PO      Take 300 mg by mouth daily        LIPITOR PO      Take 10 mg by mouth every evening        loratadine 10 MG tablet    CLARITIN     Take 10 mg by mouth daily        LOSARTAN POTASSIUM PO      Take 12.5 mg by mouth daily        Multi-vitamin Tabs tablet      Take 1 tablet by mouth daily as needed        VITAMIN D (CHOLECALCIFEROL) PO      Take 2,000 Units by mouth daily

## 2017-11-30 NOTE — MR AVS SNAPSHOT
After Visit Summary   11/30/2017    Julee Ralph    MRN: [id number]           Patient Information     Date Of Birth          1955        Visit Information        Provider Department      11/30/2017 12:30 PM 1, Belk[place] Bmt Nurse [facility] Blood and Marrow Transplant        Today's Diagnoses     Non-Hodgkin's lymphoma (H)        Personal history of diseases of blood and blood-forming organs        Lymphoma, mantle cell, multiple sites (H)              [facility] ([facility])  [address] [place] [address]  Phone: [phone]  Clinic Hours:   Monday-Thursday:7am to 7pm   Friday: 7am to 5pm   Weekends and holidays:    8am to noon (in general)  If your fever is 100.5  or greater,   call the clinic.  After hours call the   hospital at [phone] or   [phone]. Ask for the BMT   fellow on-call            Follow-ups after your visit        Who to contact     If you have questions or need follow up information about today's clinic visit or your schedule please contact [facility] BLOOD AND MARROW TRANSPLANT directly at [phone].  Normal or non-critical lab and imaging results will be communicated to you by Banister Workshart, letter or phone within 4 business days after the clinic has received the results. If you do not hear from us within 7 days, please contact the clinic through DGTS or phone. If you have a critical or abnormal lab result, we will notify you by phone as soon as possible.  Submit refill requests through DGTS or call your pharmacy and they will forward the refill request to us. Please allow 3 business days for your refill to be completed.          Additional Information About Your Visit        MyChart Information     DGTS gives you secure access to your electronic health record. If you see a primary care provider, you can also send messages to your care team and make appointments. If you have questions, please call your primary care clinic.  If you do not have a  primary care provider, please call [phone] and they will assist you.        Care EveryWhere ID     This is your Care EveryWhere ID. This could be used by other organizations to access your [place] medical records  [id number]         Blood Pressure from Last 3 Encounters:   12/06/17 132/85   12/05/17 130/81   12/01/17 119/70    Weight from Last 3 Encounters:   12/06/17 76 kg (167 lb 8 oz)   12/01/17 75.8 kg (167 lb 1.7 oz)   11/29/17 76.5 kg (168 lb 11.2 oz)              We Performed the Following     EKG 12-lead complete w/read - Clinics        Recent Review Flowsheet Data     BMT Recent Results Latest Ref Rng & Units 11/29/2017 11/30/2017 12/5/2017 12/6/2017    WBC 4.0 - 11.0 10e9/L 3.6(L) 3.2(L) - -    Hemoglobin 11.7 - 15.7 g/dL 9.3(L) 9.0(L) - -    Platelet Count 150 - 450 10e9/L 147(L) 136(L) - -    Neutrophils (Absolute) 1.6 - 8.3 10e9/L 1.6 1.4(L) - -    INR 0.86 - 1.14 0.96 - - -    Sodium 133 - 144 mmol/L 142 - - 139    Potassium 3.4 - 5.3 mmol/L 3.8 - - 4.2    Chloride 94 - 109 mmol/L 109 - - 106    Glucose 70 - 99 mg/dL 104(H) 94 - 84    Urea Nitrogen 7 - 30 mg/dL 45(H) - - 37(H)    Creatinine 0.52 - 1.04 mg/dL 1.69(H) - 1.69(H) 1.69(H)    Calcium (Total) 8.5 - 10.1 mg/dL 9.3 - - 9.4    Protein (Total) 6.8 - 8.8 g/dL 6.6(L) - - -    Albumin 3.4 - 5.0 g/dL 3.6 - - -    Alkaline Phosphatase 40 - 150 U/L 89 - - -    AST 0 - 45 U/L 21 - - -    ALT 0 - 50 U/L 27 - - -    MCV 78 - 100 fl 103(H) 101(H) - -               Primary Care Provider Office Phone # Fax #    Rebekah Arevalo [phone]       [facility] [address] [facility] [address]  [place] [address]        Equal Access to Services     [facility]: Ewa li Sostephan, waaxda luqadaha, qaybta kaalmada adeegyasolange, dallas collado . [facility] [phone].    ATENCIÓN: Si habla español, tiene a dexter disposición servicios gratuitos de asistencia lingüística. Ana al [phone].    We  comply with applicable federal civil rights laws and [place] laws. We do not discriminate on the basis of race, color, national origin, age, disability, sex, sexual orientation, or gender identity.            Thank you!     Thank you for choosing [facility] BLOOD AND MARROW TRANSPLANT  for your care. Our goal is always to provide you with excellent care. Hearing back from our patients is one way we can continue to improve our services. Please take a few minutes to complete the written survey that you may receive in the mail after your visit with us. Thank you!             Your Updated Medication List - Protect others around you: Learn how to safely use, store and throw away your medicines at www.disposemymeds.org.          This list is accurate as of: 11/30/17 11:59 PM.  Always use your most recent med list.                   Brand Name Dispense Instructions for use Diagnosis    ALLOPURINOL PO      Take 300 mg by mouth daily        LIPITOR PO      Take 10 mg by mouth every evening        loratadine 10 MG tablet    CLARITIN     Take 10 mg by mouth daily        LOSARTAN POTASSIUM PO      Take 12.5 mg by mouth daily        Multi-vitamin Tabs tablet      Take 1 tablet by mouth daily as needed        VITAMIN D (CHOLECALCIFEROL) PO      Take 2,000 Units by mouth daily

## 2017-11-30 NOTE — PROGRESS NOTES
BMT ONC Adult Bone Marrow Biopsy Procedure Note  November 30, 2017  /74  Pulse 105  Temp 98.2  F (36.8  C)     Learning needs assessment complete within 12 months? YES    DIAGNOSIS: HNL     PROCEDURE: Unilateral Bone Marrow Biopsy and Unilateral Aspirate    LOCATION: [facility] 2nd Floor    Patient s identification was positively verified by verbal identification and invasive procedure safety checklist was completed. Informed consent was obtained. Following the administration of Midazolam  2mgas pre-medication, patient was placed in the prone position and prepped and draped in a sterile manner. Approximately 15 cc of 1% Lidocaine was used over the left posterior iliac spine. Following this a 3 mm incision was made. Trephine bone marrow core(s) was (were) obtained from the [facility]. Bone marrow aspirates were obtained from the IC. Aspirates were sent for morphology, immunophenotyping, cytogenetics and molecular diagnostics . A total of approximately 20 ml of marrow was aspirated. Following this procedure a sterile dressing was applied to the bone marrow biopsy site(s). The patient was placed in the supine position to maintain pressure on the biopsy site. Post-procedure wound care instructions were given.     Complications: NO    Pre-procedural pain: 0 out of 10 on the numeric pain rating scale.     Procedural pain: 4 out of 10 on the numeric pain rating scale.     Post-procedural pain assessment: 0 out of 10 on the numeric pain rating scale.     Interventions: NO    Length of procedure:20 minutes or less      Procedure performed by: Pepper Simmons

## 2017-12-01 ENCOUNTER — ALLIED HEALTH/NURSE VISIT (OUTPATIENT)
Dept: TRANSPLANT | Facility: CLINIC | Age: 62
End: 2017-12-01
Attending: INTERNAL MEDICINE
Payer: COMMERCIAL

## 2017-12-01 ENCOUNTER — HOSPITAL ENCOUNTER (OUTPATIENT)
Dept: LAB | Facility: CLINIC | Age: 62
Discharge: HOME | End: 2017-12-01
Attending: INTERNAL MEDICINE | Admitting: INTERNAL MEDICINE
Payer: COMMERCIAL

## 2017-12-01 VITALS
RESPIRATION RATE: 20 BRPM | SYSTOLIC BLOOD PRESSURE: 119 MMHG | DIASTOLIC BLOOD PRESSURE: 70 MMHG | TEMPERATURE: 98.1 F | BODY MASS INDEX: 30.56 KG/M2 | HEART RATE: 99 BPM | WEIGHT: 167.11 LBS

## 2017-12-01 DIAGNOSIS — Z71.9 VISIT FOR COUNSELING: Primary | ICD-10-CM

## 2017-12-01 DIAGNOSIS — Z86.2 PERSONAL HISTORY OF DISEASES OF BLOOD AND BLOOD-FORMING ORGANS: ICD-10-CM

## 2017-12-01 DIAGNOSIS — C85.90 NON-HODGKIN'S LYMPHOMA (H): Primary | ICD-10-CM

## 2017-12-01 DIAGNOSIS — C85.90 NON-HODGKIN'S LYMPHOMA (H): ICD-10-CM

## 2017-12-01 LAB
COPATH REPORT: NORMAL
COPATH REPORT: NORMAL
DONOR CYTOMEGALOVIRUS ABY: POSITIVE
DONOR HEP B CORE ABY: NONREACTIVE
DONOR HEP B SURF AGN: NONREACTIVE
DONOR HEPATITIS C ABY: NONREACTIVE
DONOR HTLV 1&2 ANTIBODY: NONREACTIVE
DONOR TREPONEMA PAL ABY: NONREACTIVE
HIV1+2 AB SERPL QL IA: NONREACTIVE
INTERPRETATION ECG - MUSE: NORMAL
MPX SERIES: NONREACTIVE
T CRUZI AB SER DONR QL: NONREACTIVE
WNV RNA SPEC QL NAA+PROBE: NONREACTIVE

## 2017-12-01 PROCEDURE — 99212 OFFICE O/P EST SF 10 MIN: CPT | Mod: ZF

## 2017-12-01 NOTE — MR AVS SNAPSHOT
After Visit Summary   12/1/2017    Julee Ralph    MRN: [id number]           Patient Information     Date Of Birth          1955        Visit Information        Provider Department      12/1/2017 10:30 AM Aruna Madrigal LICSW; UC 2 118 CONSULT  CORAL [facility] Blood and Marrow Transplant        Today's Diagnoses     Visit for counseling    -  1    Non-Hodgkin's lymphoma (H)        Personal history of diseases of blood and blood-forming organs              Cl[facility]ics and Surgery Center ([facility])  [address] [place] [address]  Phone: [phone]  Clinic Hours:   Monday-Thursday:7am to 7pm   Friday: 7am to 5pm   Weekends and holidays:    8am to noon (in general)  If your fever is 100.5  or greater,   call the clinic.  After hours call the   hospital at [phone] or   [phone]. Ask for the BMT   fellow on-call            Follow-ups after your visit        Your next 10 appointments already scheduled     Dec 05, 2017 10:30 AM CST   BMT NURSE COORD WITH ROOM with  Bmt Nurse Coordinator,  2 114 CONSULT [facility] Blood and Marrow Transplant ([facility])    [address] [place] [address]  2nd [facility] [address] [phone]            Dec 05, 2017  1:15 PM CST   Central Venous Catheter - [address] [place] [address] with Shivani Flores RN,  2 117 CONSULT [facility] ([facility])    [address] [facility] [place] [address]              Appointment is located at [address] [place] [address]            Dec 05, 2017  3:00 PM CST   (Arrive by 2:45 PM)   New Patient Visit with Mauricio Gray PA-C   [facility] Interventional Radiology ([facility])    [address] [place] [address]  1st Floor  [facility] [id number] [phone]            Dec 06, 2017 10:30 AM CST   RESEARCH WITH ROOM with  Bmt Research Coordinator,  2 119 CONSULT    CORAL  [facility] Blood and Marrow Transplant ([facility])    [address] [facility]  2nd [facility] [id number] [phone]            Dec 06, 2017 11:30 AM CST   PHARM D CONSULT WITH ROOM with Uc Bmt Pharm D, RPLUZ MARINA,  2 119 CONSULT RM   [facility] Blood and Marrow Transplant ([facility])    [address] [place] [id number] [phone]            Dec 06, 2017 12:00 PM CST   BMT Workup Visit with Dwayne Cuba MD   [facility] Blood and Marrow Transplant ([facility])    [address] [place] [id number] [phone]              Who to contact     If you have questions or need follow up information about today's clinic visit or your schedule please contact [facility] BLOOD AND MARROW TRANSPLANT directly at [phone].  Normal or non-critical lab and imaging results will be communicated to you by ModusPhart, letter or phone within 4 business days after the clinic has received the results. If you do not hear from us within 7 days, please contact the clinic through ModusPhart or phone. If you have a critical or abnormal lab result, we will notify you by phone as soon as possible.  Submit refill requests through Windtronics or call your pharmacy and they will forward the refill request to us. Please allow 3 business days for your refill to be completed.          Additional Information About Your Visit        ModusPharShunWang Technology Information     Windtronics gives you secure access to your electronic health record. If you see a primary care provider, you can also send messages to your care team and make appointments. If you have questions, please call your primary care clinic.  If you do not have a primary care provider, please call [phone] and they will assist you.        Care EveryWhere ID     This is your Care EveryWhere ID. This could be used by other organizations to access your [facility]  records  [id number]         Blood Pressure from Last 3 Encounters:   12/01/17 119/70   11/30/17 123/77   11/29/17 126/73    Weight from Last 3 Encounters:   12/01/17 75.8 kg (167 lb 1.7 oz)   11/29/17 76.5 kg (168 lb 11.2 oz)   11/08/17 77.6 kg (171 lb 1.6 oz)              We Performed the Following             Recent Review Flowsheet Data     BMT Recent Results Latest Ref Rng & Units 11/29/2017 11/30/2017    WBC 4.0 - 11.0 10e9/L 3.6(L) 3.2(L)    Hemoglobin 11.7 - 15.7 g/dL 9.3(L) 9.0(L)    Platelet Count 150 - 450 10e9/L 147(L) 136(L)    Neutrophils (Absolute) 1.6 - 8.3 10e9/L 1.6 1.4(L)    INR 0.86 - 1.14 0.96 -    Sodium 133 - 144 mmol/L 142 -    Potassium 3.4 - 5.3 mmol/L 3.8 -    Chloride 94 - 109 mmol/L 109 -    Glucose 70 - 99 mg/dL 104(H) 94    Urea Nitrogen 7 - 30 mg/dL 45(H) -    Creatinine 0.52 - 1.04 mg/dL 1.69(H) -    Calcium (Total) 8.5 - 10.1 mg/dL 9.3 -    Protein (Total) 6.8 - 8.8 g/dL 6.6(L) -    Albumin 3.4 - 5.0 g/dL 3.6 -    Alkaline Phosphatase 40 - 150 U/L 89 -    AST 0 - 45 U/L 21 -    ALT 0 - 50 U/L 27 -    MCV 78 - 100 fl 103(H) 101(H)               Primary Care Provider Office Phone # Fax #    Rebekah Arevalo [phone]       [facility] [address] [facility]  [place] [address]        Equal Access to Services     [facility] JOSE AH: Hadii rin ku hadasho Soomaali, waaxda luqadaha, qaybta kaalmada rei, dallas blackburn. So [facility] [phone].    ATENCIÓN: Si habla español, tiene a dexter disposición servicios gratuitos de asistencia lingüística. Llame al [phone].    We comply with applicable federal civil rights laws and [place] laws. We do not discriminate on the basis of race, color, national origin, age, disability, sex, sexual orientation, or gender identity.            Thank you!     Thank you for choosing [facility] BLOOD AND MARROW TRANSPLANT  for your care. Our goal is always to provide you with excellent  care. Hearing back from our patients is one way we can continue to improve our services. Please take a few minutes to complete the written survey that you may receive in the mail after your visit with us. Thank you!             Your Updated Medication List - Protect others around you: Learn how to safely use, store and throw away your medicines at www.disposemymeds.org.          This list is accurate as of: 12/1/17 11:59 PM.  Always use your most recent med list.                   Brand Name Dispense Instructions for use Diagnosis    ALLOPURINOL PO      Take 300 mg by mouth daily        LIPITOR PO      Take 10 mg by mouth every evening        loratadine 10 MG tablet    CLARITIN     Take 10 mg by mouth daily        LOSARTAN POTASSIUM PO      Take 12.5 mg by mouth daily        Multi-vitamin Tabs tablet      Take 1 tablet by mouth daily as needed        VITAMIN D (CHOLECALCIFEROL) PO      Take 2,000 Units by mouth daily

## 2017-12-01 NOTE — CONSULTS
Transfusion Medicine Consultation    Julee Ralph [id number]   YOB: 1955 Age: 62 year old   Date of Consult: 12/1/2017     Reason for consult: Autologous Hematopoietic Progenitor Cell (HPC) Collection           Assessment and Plan:   62 year old female presents for consultation for autologous HPC collection for mantle cell lymphoma.  The plan is to collect for 1 to 3 days or until the target goal is met.   A central line will be placed and will be used for access for the procedure.          Chief Complaint:   Transfusion medicine consultation.         History of Present Illness:   62 year old female presents for consultation for autologous HPC collection.  Her past medical history includes mantle cell lymphoma, blastoid variant, diagnosed in the spring of 2017 in a tonsil biopsy (05/04/2017), following a sore throat with lymphadenopathy.  She wa treated with 6 cycles of R-CHOP alternating with R-DHAP, and is in clinical remission.  She is currently well.  The patient denies any back pain that would prevent her from tolerating the procedure and she has no allergies to latex. The patient has no identifiable risk factors for infectious disease.  The procedure, risks/benefits were discussed with the patient and all of her questions were answered in the company of her , Alejandro.             Past Medical History:   I have reviewed this patient's past medical history, significant for hypertension and hyperlipidemia, treated with medication.          Past Surgical History:   Hysterectomy (1995).           Social History:     Social History   Substance Use Topics     Smoking status: Former Smoker     Smokeless tobacco: Never Used      Comment: 8 years quit     Alcohol use No   She lives in [place], [place] with her .          Family History:   This patient has no significant family history          Immunizations:     Immunization History   Administered Date(s) Administered     Influenza Vaccine IM  3yrs+ 4 Valent IIV4 11/08/2017             Allergies:     Allergies   Allergen Reactions     Seasonal Allergies      Itchy eyes             Medications:   I have reviewed this patient's current medications.  She does have questions about her losartan prescription and line placement, and will direct them to the BMT team.          Review of Systems:   EYES:  negative  HEENT:  negative  RESPIRATORY:  negative for  dry cough, dyspnea, wheezing and chest pain  CARDIOVASCULAR:  negative for  chest pain, dyspnea, palpitations, fatigue  GASTROINTESTINAL:  negative for nausea, vomiting, change in bowel habits, diarrhea, constipation and abdominal pain  HEMATOLOGIC/LYMPHATIC:  negative for easy bruising, bleeding and lymphadenopathy  ALLERGIC/IMMUNOLOGIC:  negative for recurrent infections  MUSCULOSKELETAL:  negative for  myalgias and pain  NEUROLOGICAL:  negative for headaches, dizziness, seizures, dysphagia; POSITIVE for parathesias of the right foot and right thumb, following chemo.  BEHAVIOR/PSYCH:  negative for depressed mood, anxiety; feels good today.           Vital Signs:   /70  Pulse 99  Temp 98.1  F (36.7  C) (Oral)  Resp 20  Wt 75.8 kg (167 lb 1.7 oz)  BMI 30.56 kg/m2            Data:     CBC RESULTS:   Recent Labs   Lab Test  11/30/17   1051   WBC  3.2*   RBC  2.66*   HGB  9.0*   HCT  26.8*   MCV  101*   MCH  33.8*   MCHC  33.6   RDW  14.2   PLT  136*       Graeme Hernandez MD  Blood Bank Fellow  [phone] pager  [phone] office  [phone] blood bank

## 2017-12-01 NOTE — CONSULTS
APHERESIS INITIAL CONSULT CHECKLIST    Current Encounter Information  Current Encounter Information: Reason for Visit, Allergies and Current Meds  Procedure Requested: MNC/PBSC Collection  History of: (Reason for Apheresis): lymphoma    Access Assessment  Access Assessment  Needs a catheter placed for Apheresis?: Yes, transfusion medicine physician informed.    Vital Signs  Vital Signs  BP: 119/70  Pulse: 99  Temp: 98.1  F (36.7  C)  Temp src: Oral  Resp: 20  Weight: 75.8 kg (167 lb 1.7 oz)    Reviewed   Review With Patient  Have you read the brochure Getting ready for Apheresis?: Yes  Have you had any invasive procedures, surgery, biopsy, bleeding in the last month?: Yes (bone marrow biopsy 11/30/17)  Transfusion medicine physician informed: Yes  Review medications and allergies: Yes  Have you ever been transfused?: Yes (RBCs and platelets, no reactions)  Do you require pre-medication for blood products?: No  Patient given tour of the unit: Yes    Additional Information  Notes, needs and time spent with patient  Explain procedure, side effects or reactions, instructions: Yes  Patient has special need?: No  Time spent: 20 min face to face time assessing pt. Reviewed the need to follow a low fat diet from the night before collection until collection is complete.Mehnaz Sterling RN

## 2017-12-04 LAB
A* LOCUS NMDP: NORMAL
A* LOCUS: NORMAL
A* NMDP: NORMAL
A*: NORMAL
ABTEST METHOD: NORMAL
B* LOCUS NMDP: NORMAL
B* LOCUS: NORMAL
B* NMDP: NORMAL
B*: NORMAL
BW-1: NORMAL
BW-2: NORMAL
C* LOCUS NMDP: NORMAL
C* LOCUS: NORMAL
C* NMDP: NORMAL
C*: NORMAL
DPA1* NMDP: NORMAL
DPA1*: NORMAL
DPB1* LOCUS NMDP: NORMAL
DPB1* NMDP: NORMAL
DPB1*: NORMAL
DPB1*LOCUS: NORMAL
DQA1*LOCUS: NORMAL
DQA1*NMDP: NORMAL
DQB1* LOCUS NMDP: NORMAL
DQB1* LOCUS: NORMAL
DQB1* NMDP: NORMAL
DQB1*: NORMAL
DRB1* LOCUS NMDP: NORMAL
DRB1* LOCUS: NORMAL
DRB1* NMDP: NORMAL
DRB1*: NORMAL
DRB3* LOCUS NMDP: NORMAL
DRB3* LOCUS: NORMAL
DRB3* NMDP: NORMAL
DRB3*: NORMAL
DRSSO TEST METHOD: NORMAL

## 2017-12-04 ASSESSMENT — PATIENT HEALTH QUESTIONNAIRE - PHQ9: SUM OF ALL RESPONSES TO PHQ QUESTIONS 1-9: 1

## 2017-12-04 NOTE — PROGRESS NOTES
CLINICAL SOCIAL WORK   PSYCHOSOCIAL ASSESSMENT  BLOOD AND MARROW TRANSPLANT SERVICE    Assessment completed of living situation, support system, financial status, functional status, coping, stressors, need for resources and social work intervention provided as needed.    Present at assessment:  Julee Ralph - patient  Cullen Ralph -   [facility] - Clinical     Diagnosis:  Mantle Cell Lymphoma    Date of Diagnosis:  March 2017    Transplant type:  Autologous    Physician:  Dr. Dwayne Cuba    Nurse Coordinator:  Lidia Morales RN    :  [facility]    Permanent Address:   [address] [place] [id number]    Local Address:  Currently staying at a local hot and will move into [facility] on 12.6.17    [facility]  [address] [place] [address]    Phone:      Home Phone [phone]   Mobile [phone]   Cullen () cell  [phone]    Presenting Information:  Julee presents to the Blood and Marrow Transplant Program at [facility] for Work-Up for a potential Autologous PBSCT as treatment for her diagnosis of Mantle Cell Lymphoma.    Decision Making:   Self    Health Care Directive:  Julee has the blank paperwork at home and she has spoken with her family about her wishes for medical care if she were unable to communicate these. She is not sure at this time if she will complete the formal paperwork.     Relationship Status:    to  Cullen for 41 years and both describe this relationship as stable and supportive.     Special Needs:   Lodging arrangements needed - they have made arrangements for [facility]    Family/Support System:   Parents, Spouse, Siblings, Friends and Support system is strong   Parents - Davida and Av live in [place]; they are aware of patient's diagnosis  Spouse - Cullen  Siblings - Rosio (61; lives in [facility]) and Lexy (66; lives outside [place], [place])  Children -  n/a  Friends    Caregiver:  Patient acknowledged understanding the requirement for a 24 hour caregiver post-transplant and signed the Caregiver Contract. Will have contract scanned into the EMR.   Spouse/Partner - Cullen will be the primary caregiver    Permanent Living Situation:   Lives with   Cullen and Johan    Temporary Living Situation:   Other - [facility] Apartments    Transportation Mode:   Private Car and No transportation concerns    Insurance:  Julee shared that she has a referral from her primary care MD through 12.31.17 and she has requested another referral from her primary care through 12.31.18. Future Insurance questions referred to [employer]  - Luz Galvan.     Sources of Income:   Payroll and Other - Julee has enough sick leave to cover her entire time away from work - this means she will receive 100% of her salary throughout her leave from work. Her  receives Social Security detention and a pension.    Employment:  Julee works as a  for [employer]. She has worked there for 12 years and plans to continue working for several more years. 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     Mental Health:   No mental health issues identified   We talked about how some patients may see an increase in feelings of anxiety or depression while hospitalized for extended periods along with isolation. Encouraged Julee and Cullen to let us know if they are noticing an increase in symptoms. We talked about the variety of modalities available to use as coping mechanisms (including but not limited to guided imagery, relaxation techniques, progressive muscle relaxation, counseling/talk therapy and medication).    PHQ-9:  Julee scored 1 which indicates none on the depression severity scale. Julee endorsed this as an accurate reflection of her mood.     Chemical Use:  Julee endorsed smoking for 30 years - she quit  "8 years ago. She occasionally has an alcoholic beverage but has not in the past 6 months since she began treatment for her MCL.     Trauma/Loss/Abuse History:   Many losses associated with cancer diagnosis and treatment (i.e. Health, changes to physical appearance, etc. . . )    Spirituality:   Julee does not have a angel luis community that she is affiliated with nor does she endorse any spiritual beliefs that would be important for us to know in order to provide care for her.     Coping:   approachable, responsive and interactive    Patient's Coping Mechanisms:  Talking with friends and family and reading (prefers paperbacks; most often reads mysteries and romance)    Caregiver's Coping Mechanisms:  Exercise (chores around house, \"putzing\", golfing, and walking), taking naps, and hobbies (golf, playing guitar, gardening and reading about financial markets)    Recreation/Leisure Activities:  Travel (wedding anniversary is in February so the usually travel someplace warm but will not be doing that this next year), riding on [place], gardening and golfing    Plans for Hospital Stay Leisure:  Reading, watching TV and sleep    Education Provided:   Transplant process expectations, Housing and relocation needs pre/post transplant, Local housing resources and costs, Caregiver requirements, Caregiver self-care, Financial issues related to transplant, Common psychosocial stressors pre/post transplant, Hospital resources available and Social work role    Interventions Provided Psychosocial support and education Assessment and Recommendations for Team:  Julee is a 62 year old female who presents with her  for her Work-Up Week Psychosocial Assessment. During our appointment Julee was alert, her affect was full, she was interactive, displayed appropriate eye contact, normal memory and thought processes. She endorsed feeling nervous (\"an overall sense\"), and ready to begin. Her  Cullen endorsed feeling positive. Julee " shared that they are looking forward to getting settled in at [facility]. We can best support Cullen and Julee by providing timely answers to questions and concerns. Encouraged them to reach out as questions or concerns arise.     Follow up Planned:   Psychosocial support

## 2017-12-05 ENCOUNTER — HOSPITAL ENCOUNTER (OUTPATIENT)
Dept: EDUCATION SERVICES | Facility: CLINIC | Age: 62
Discharge: HOME | End: 2017-12-05
Attending: INTERNAL MEDICINE | Admitting: INTERNAL MEDICINE
Payer: COMMERCIAL

## 2017-12-05 ENCOUNTER — OFFICE VISIT (OUTPATIENT)
Dept: INTERVENTIONAL RADIOLOGY/VASCULAR | Facility: CLINIC | Age: 62
End: 2017-12-05
Attending: INTERNAL MEDICINE

## 2017-12-05 ENCOUNTER — OFFICE VISIT (OUTPATIENT)
Dept: TRANSPLANT | Facility: CLINIC | Age: 62
End: 2017-12-05
Attending: INTERNAL MEDICINE
Payer: COMMERCIAL

## 2017-12-05 VITALS — HEART RATE: 95 BPM | DIASTOLIC BLOOD PRESSURE: 81 MMHG | OXYGEN SATURATION: 98 % | SYSTOLIC BLOOD PRESSURE: 130 MMHG

## 2017-12-05 DIAGNOSIS — Z86.2 PERSONAL HISTORY OF DISEASES OF BLOOD AND BLOOD-FORMING ORGANS: ICD-10-CM

## 2017-12-05 DIAGNOSIS — C83.18 LYMPHOMA, MANTLE CELL, MULTIPLE SITES (H): ICD-10-CM

## 2017-12-05 DIAGNOSIS — C85.90 NON-HODGKIN'S LYMPHOMA (H): ICD-10-CM

## 2017-12-05 LAB
DLCOCOR-%PRED-PRE: 87 %
DLCOCOR-PRE: 17.64 ML/MIN/MMHG
DLCOUNC-%PRED-PRE: 72 %
DLCOUNC-PRE: 14.68 ML/MIN/MMHG
DLCOUNC-PRED: 20.12 ML/MIN/MMHG
ERV-%PRED-PRE: 13 %
ERV-PRE: 0.06 L
ERV-PRED: 0.46 L
EXPTIME-PRE: 6.62 SEC
FEF2575-%PRED-PRE: 130 %
FEF2575-PRE: 2.56 L/SEC
FEF2575-PRED: 1.97 L/SEC
FEFMAX-%PRED-PRE: 92 %
FEFMAX-PRE: 5.22 L/SEC
FEFMAX-PRED: 5.66 L/SEC
FEV1-%PRED-PRE: 99 %
FEV1-PRE: 2.16 L
FEV1FEV6-PRE: 84 %
FEV1FEV6-PRED: 80 %
FEV1FVC-PRE: 84 %
FEV1FVC-PRED: 79 %
FEV1SVC-PRE: 82 %
FEV1SVC-PRED: 76 %
FIFMAX-PRE: 3.37 L/SEC
FRCPLETH-%PRED-PRE: 81 %
FRCPLETH-PRE: 2.08 L
FRCPLETH-PRED: 2.56 L
FVC-%PRED-PRE: 92 %
FVC-PRE: 2.57 L
FVC-PRED: 2.77 L
IC-%PRED-PRE: 107 %
IC-PRE: 2.58 L
IC-PRED: 2.4 L
RVPLETH-%PRED-PRE: 108 %
RVPLETH-PRE: 2.01 L
RVPLETH-PRED: 1.86 L
TLCPLETH-%PRED-PRE: 103 %
TLCPLETH-PRE: 4.66 L
TLCPLETH-PRED: 4.52 L
VA-%PRED-PRE: 82 %
VA-PRE: 3.82 L
VC-%PRED-PRE: 92 %
VC-PRE: 2.65 L
VC-PRED: 2.86 L

## 2017-12-05 PROCEDURE — 40000268 ZZH STATISTIC NO CHARGES: Mod: ZF

## 2017-12-05 PROCEDURE — 00000095 ZZHCL STATISTIC CREATININE CLEARANCE: Performed by: INTERNAL MEDICINE

## 2017-12-05 PROCEDURE — 84156 ASSAY OF PROTEIN URINE: CPT | Mod: 91 | Performed by: INTERNAL MEDICINE

## 2017-12-05 PROCEDURE — 83883 ASSAY NEPHELOMETRY NOT SPEC: CPT | Performed by: INTERNAL MEDICINE

## 2017-12-05 PROCEDURE — 86335 IMMUNFIX E-PHORSIS/URINE/CSF: CPT | Performed by: INTERNAL MEDICINE

## 2017-12-05 PROCEDURE — 84166 PROTEIN E-PHORESIS/URINE/CSF: CPT | Performed by: INTERNAL MEDICINE

## 2017-12-05 PROCEDURE — 84156 ASSAY OF PROTEIN URINE: CPT | Performed by: INTERNAL MEDICINE

## 2017-12-05 PROCEDURE — 81050 URINALYSIS VOLUME MEASURE: CPT | Performed by: INTERNAL MEDICINE

## 2017-12-05 NOTE — MR AVS SNAPSHOT
After Visit Summary   12/5/2017    Julee Ralph    MRN: [id number]           Patient Information     Date Of Birth          1955        Visit Information        Provider Department      12/5/2017 10:30 AM Coordinator,  Bmt Nurse;  [address] CONS[address] [facility] Blood and Marrow Transplant        Today's Diagnoses     Non-Hodgkin's lymphoma (H)        Personal history of diseases of blood and blood-forming organs        Lymphoma, mantle cell, multiple sites (H)              [facility]  [address] [place] [address]  Phone: [phone]  Clinic Hours:   Monday-Thursday:7am to 7pm   Friday: 7am to 5pm   Weekends and holidays:    8am to noon (in general)  If your fever is 100.5  or greater,   call the clinic.  After hours call the   hospital at [phone] or   [phone]. Ask for the BMT   fellow on-call            Follow-ups after your visit        Who to contact     If you have questions or need follow up information about today's clinic visit or your schedule please contact [facility] BLOOD AND MARROW TRANSPLANT directly at [phone].  Normal or non-critical lab and imaging results will be communicated to you by Bleacher[employer]art, letter or phone within 4 business days after the clinic has received the results. If you do not hear from us within 7 days, please contact the clinic through [employer] or phone. If you have a critical or abnormal lab result, we will notify you by phone as soon as possible.  Submit refill requests through [employer] or call your pharmacy and they will forward the refill request to us. Please allow 3 business days for your refill to be completed.          Additional Information About Your Visit        MyChart Information     [employer] gives you secure access to your electronic health record. If you see a primary care provider, you can also send messages to your care team and make appointments. If you have questions, please call your primary care  clinic.  If you do not have a primary care provider, please call [phone] and they will assist you.        Care EveryWhere ID     This is your Care EveryWhere ID. This could be used by other organizations to access your [facility] medical records  [id number]         Blood Pressure from Last 3 Encounters:   12/06/17 132/85   12/05/17 130/81   12/01/17 119/70    Weight from Last 3 Encounters:   12/06/17 76 kg (167 lb 8 oz)   12/01/17 75.8 kg (167 lb 1.7 oz)   11/29/17 76.5 kg (168 lb 11.2 oz)              We Performed the Following     BMT Coordinator        Recent Review Flowsheet Data     BMT Recent Results Latest Ref Rng & Units 11/29/2017 11/30/2017 12/5/2017 12/6/2017    WBC 4.0 - 11.0 10e9/L 3.6(L) 3.2(L) - -    Hemoglobin 11.7 - 15.7 g/dL 9.3(L) 9.0(L) - -    Platelet Count 150 - 450 10e9/L 147(L) 136(L) - -    Neutrophils (Absolute) 1.6 - 8.3 10e9/L 1.6 1.4(L) - -    INR 0.86 - 1.14 0.96 - - -    Sodium 133 - 144 mmol/L 142 - - 139    Potassium 3.4 - 5.3 mmol/L 3.8 - - 4.2    Chloride 94 - 109 mmol/L 109 - - 106    Glucose 70 - 99 mg/dL 104(H) 94 - 84    Urea Nitrogen 7 - 30 mg/dL 45(H) - - 37(H)    Creatinine 0.52 - 1.04 mg/dL 1.69(H) - 1.69(H) 1.69(H)    Calcium (Total) 8.5 - 10.1 mg/dL 9.3 - - 9.4    Protein (Total) 6.8 - 8.8 g/dL 6.6(L) - - -    Albumin 3.4 - 5.0 g/dL 3.6 - - -    Alkaline Phosphatase 40 - 150 U/L 89 - - -    AST 0 - 45 U/L 21 - - -    ALT 0 - 50 U/L 27 - - -    MCV 78 - 100 fl 103(H) 101(H) - -               Primary Care Provider Office Phone # Fax #    Rebekah Arevalo [phone]       [facility] [address] [place] [address]        Equal Access to Services     [facility]: Ewa li Sostephan, waaxda luqadaha, qaybta kaalmada rei, d[place] blackburn. Formerly [facility] [phone].    ATENCIÓN: Si habla español, tiene a dexter disposición servicios gratuitos de asistencia lingüística. Llame al  [phone].    We comply with applicable federal civil rights laws and [place] laws. We do not discriminate on the basis of race, color, national origin, age, disability, sex, sexual orientation, or gender identity.            Thank you!     Thank you for choosing [facility] BLOOD AND MARROW TRANSPLANT  for your care. Our goal is always to provide you with excellent care. Hearing back from our patients is one way we can continue to improve our services. Please take a few minutes to complete the written survey that you may receive in the mail after your visit with us. Thank you!             Your Updated Medication List - Protect others around you: Learn how to safely use, store and throw away your medicines at www.disposemymeds.org.          This list is accurate as of: 12/5/17 11:59 PM.  Always use your most recent med list.                   Brand Name Dispense Instructions for use Diagnosis    ALLOPURINOL PO      Take 300 mg by mouth daily        LIPITOR PO      Take 10 mg by mouth every evening        loratadine 10 MG tablet    CLARITIN     Take 10 mg by mouth daily        LOSARTAN POTASSIUM PO      Take 12.5 mg by mouth daily        Multi-vitamin Tabs tablet      Take 1 tablet by mouth daily as needed        VITAMIN D (CHOLECALCIFEROL) PO      Take 2,000 Units by mouth daily

## 2017-12-05 NOTE — PROGRESS NOTES
Patient Name:  Julee Ralph   YOB: 1955  Medical Record Number (MRN):  [id number]  Age:  62 year old female    Referring Provider:  YOANDY MONTALVO    Reason for Referral:  Interventional Radiology consult.    Requested Intervention:  Tunneled central venous catheter placement    Associated Diagnosis:  Mantel cell lymphoma.    =====    History of Present Illness: Patient is a pleasant 62 year old lady with a history of mantel cell lymphoma, with existing right IJ central venous chest port. Patient now undergoing workup for autologous stem cell transplant. Patient's team requesting large bore tunneled central venous catheter placement.    =====    Past Medical History:  No past medical history on file.    Past Surgical History:  No past surgical history on file.    Family History:  No family history on file.    Social History:  Social History   Substance Use Topics     Smoking status: Former Smoker     Smokeless tobacco: Never Used      Comment: 8 years quit     Alcohol use No       Problem List:  Patient Active Problem List    Diagnosis Date Noted     Lymphoma, mantle cell, multiple sites (H) 11/08/2017     Priority: Medium       Medications:  Prescription Medications as of 12/5/2017             ALLOPURINOL PO Take 300 mg by mouth daily    VITAMIN D, CHOLECALCIFEROL, PO Take 2,000 Units by mouth daily    loratadine (CLARITIN) 10 MG tablet Take 10 mg by mouth daily    multivitamin, therapeutic with minerals (MULTI-VITAMIN) TABS tablet Take 1 tablet by mouth daily as needed     LOSARTAN POTASSIUM PO Take 12.5 mg by mouth daily    Atorvastatin Calcium (LIPITOR PO) Take 10 mg by mouth every evening          Allergies:  Allergies   Allergen Reactions     Seasonal Allergies      Itchy eyes       Vital Signs:  /81  Pulse 95  SpO2 98%    =====    Results Reviewed:    Complete Blood Count  Lab Results   Component Value Date    WBC 3.2 11/30/2017     Lab Results   Component Value Date    RBC  2.66 11/30/2017     Lab Results   Component Value Date    HGB 9.0 11/30/2017     Lab Results   Component Value Date    HCT 26.8 11/30/2017     Lab Results   Component Value Date     11/30/2017       Metabolic Panel  Lab Results   Component Value Date     11/29/2017     Lab Results   Component Value Date    POTASSIUM 3.8 11/29/2017     Lab Results   Component Value Date    CHLORIDE 109 11/29/2017     Lab Results   Component Value Date    ANDREIA 9.3 11/29/2017     No components found for: CARBONDIOXIDE  Lab Results   Component Value Date    BUN 45 11/29/2017     Lab Results   Component Value Date    GFRESTIMATED 30 11/29/2017     Lab Results   Component Value Date     11/29/2017       Coagulation  Lab Results   Component Value Date    INR 0.96 11/29/2017     Lab Results   Component Value Date    PTT 28 11/29/2017       =====    Imaging Reviewed:  I have personally reviewed images from PET (11/30/2017).    =====    Review of Systems:  (Positive items in bold. Otherwise negative)    Constitutional symptoms:  Negative for fever/chills, fatigue, night sweats, loss of consciousness, anorexia, myalgia, unexplained weight loss.    Ears, nose, mouth, and throat:  Negative for visual changes, changes in hearing, nose bleeds, tinnitus, trouble swallowing, sore throat, runny nose.    Cardiovascular:  Negative for chest pain, palpitations, dyspnea on exertion, cold extremities.    Respiratory:  Negative for cough, hemoptysis, shortness of breath, pleuritic pain.    Gastrointestinal:  Negative for abdominal pain, nausea/vomiting, constipation, diarrhea, melena, hematochezia.    Genitourinary:  Negative for dysuria, polyuria, hematuria, incontinence.    Musculoskeletal:  Negative for pain, limited range of motion, joint swelling, functional deficit, arthritis.    Integumentary:  Negative for rashes, lesions, pruritus, wounds, nodules, tumors, discoloration.    Neurological:  Negative for functional deficit,  headache, paresthesias, numbness (lateral and inferior right foot, attributed to chemotherapy), weakness, trouble with gait, dizziness, pain.    Endocrine:  Negative for heat or cold intolerance, polydipsia, polyuria.    Psychiatric: Negative for hallucinations, anxiety, depression.    =====    Physical Exam:     General:  No apparent distress, alert, oriented, afebrile, vital signs within normal limits.    Head, ears, nose, mouth, and throat:  Pupils equally round and anicteric. Head atraumatic/normocephalic, oropharynx clear, mucous membranes moist, neck supple.    Cardiovascular:  S1/S2, regular rate and rhythm, no murmur, rubs, or gallops appreciated.    Respiratory:  Lungs clear to auscultation bilaterally.    Gastrointestinal:  Abdomen is soft, nontender, and nondistended.     Musculoskeletal:  Right chest port site is non-tender. Moves all extremities well. Upper extremity ROM grossly intact bilaterally. Neck ROM grossly intact.    Integumentary:  No erythema, swelling, rash, or lesions appreciated.    Neurological:  Motor and sensory function grossly intact. Gait intact.    =====    RISKS OF THE PROCEDURE:  The risks of the procedure were discussed, including;  infection, bleeding, and unintentional damage. The risk of venous thrombus post catheter placement was discussed.    CONSENT:  The patient understood the limitations, alternatives, and risks of the procedure and agreed to the procedure.     SEDATION CONSENT:  It was explained to the patient that medications used for sedation and pain relief may be administered, if needed, to reduce anxiety and discomfort that may be associated with the procedure.  Serious side effects from the medications are rare but may include nausea, temporary altered mental status, temporary amnesia, or respiratory depression.    =====    PRE-PROCEDURE ASSESSMENT:    Ability to open mouth and stick out tongue:  Yes.    Mallampati score:    o Mallampati II: two things not visible:  the anterior and posterior pillars. (Any range of laryngoscopic difficulty.)       Sedation plan:  Moderate sedation, utilizing midazolam and fentanyl    Classification of physical status, ASA score:    o P3 = A patient with severe systemic disease.       I have reviewed the patient data and determined they are a candidate for sedation.    =====    PATIENT INSTRUCTIONS AND GUIDELINES:    If you are taking the medicine Lovenox (enoxaparin), do not take this medicine 24 hours prior to your scheduled procedure. If you are taking twice daily hold the evening dose prior to the procedure as well as the morning dose the day of the procedure.    If you are taking the medicine Coumadin (warfarin), stop taking the medicine 5 days prior to your scheduled procedure.    If you are taking a medicine classified as an  anti-platelet inhibitors  (not including aspirin) stop taking the medicine 5 days prior to your scheduled procedure.    If you are taking the medicine Aspirin or Ibuprofen stop taking the medicine 5 days prior to your scheduled procedure.     No solid foods or milk products for 6 hours prior to the procedure.    You may have clear liquids up to 2 hours prior to the procedure.     Antibacterial scrub; 2 times the day before the procedure and once the day of your procedure. Clean mid chest to earlobes, both sides of the neck and chest. Place scrub on wet wash cloth, scrub on and leave for 5 minutes. Wash off with a clean wash cloth and pat dry skin, or shower.    If you or having your procedure in interventional radiology (IR); arrive in the Gold Waiting room at your scheduled arrival time on the day of your procedure. This is on the second floor of the hospital, located down the gonzales from the [facility].    =====    ASSESSMENT AND PLAN:  Type of catheter:  Large bore double lumen tunneled apheresis capable catheter   Preferred Location:  Left internal jugular vein     Patient is a pleasant 62 year old lady  with a history of mantle cell lymphoma, with existing right IJ central venous chest port. Patient's team is requesting large bore tunneled central venous catheter placement for autologous stem cell transplant.    I have reviewed the patient data and determined that the requested procedure is indicated, technically achievable, and that the patient is a candidate for moderate sedation. The patient is without signs or symptoms of infection at this time.    Proceed with scheduling for image guided placement of left IJ, 14.5 Fr. Double lumen tunneled central venous catheter in interventional radiology at the [facility] - May schedule in outpatient ASC ([address] [place] [address]).     Thank you for the consultation and for letting Interventional Radiology help you care for your patient.    Sincerely,    Mauricio Gray PA-C, [facility]  Physician Assistant  Interventional Radiology  [phone] (pager)  [phone] (IR control)    =====    A total of 25 minutes was spent with the patient. Greater than 50% of the time was spent in counseling, education, and coordination of care. All of the patient's questions were answered and concerns were addressed.    CC:  Patient Care Team  YOANDY MONTALVO

## 2017-12-05 NOTE — LETTER
12/5/2017        RE: Julee Ralph  [address]  [facility] [address]     Dear Colleague,    Thank you for referring your patient, Julee Ralph, to the [facility] INTERVENTIONAL RADIOLOGY at [facility]. Please see a copy of my visit note below.    Patient Name:  Julee Ralph   YOB: 1955  Medical Record Number (MRN):  [id number]  Age:  62 year old female    Referring Provider:  YOANDY MONTALVO    Reason for Referral:  Interventional Radiology consult.    Requested Intervention:  Tunneled central venous catheter placement    Associated Diagnosis:  Mantel cell lymphoma.    =====    History of Present Illness: Patient is a pleasant 62 year old lady with a history of mantel cell lymphoma, with existing right IJ central venous chest port. Patient now undergoing workup for autologous stem cell transplant. Patient's team requesting large bore tunneled central venous catheter placement.    =====    Past Medical History:  No past medical history on file.    Past Surgical History:  No past surgical history on file.    Family History:  No family history on file.    Social History:  Social History   Substance Use Topics     Smoking status: Former Smoker     Smokeless tobacco: Never Used      Comment: 8 years quit     Alcohol use No       Problem List:  Patient Active Problem List    Diagnosis Date Noted     Lymphoma, mantle cell, multiple sites (H) 11/08/2017     Priority: Medium       Medications:  Prescription Medications as of 12/5/2017             ALLOPURINOL PO Take 300 mg by mouth daily    VITAMIN D, CHOLECALCIFEROL, PO Take 2,000 Units by mouth daily    loratadine (CLARITIN) 10 MG tablet Take 10 mg by mouth daily    multivitamin, therapeutic with minerals (MULTI-VITAMIN) TABS tablet Take 1 tablet by mouth daily as needed     LOSARTAN POTASSIUM PO Take 12.5 mg by mouth daily    Atorvastatin Calcium (LIPITOR PO) Take 10 mg by mouth every evening           Allergies:  Allergies   Allergen Reactions     Seasonal Allergies      Itchy eyes       Vital Signs:  /81  Pulse 95  SpO2 98%    =====    Results Reviewed:    Complete Blood Count  Lab Results   Component Value Date    WBC 3.2 11/30/2017     Lab Results   Component Value Date    RBC 2.66 11/30/2017     Lab Results   Component Value Date    HGB 9.0 11/30/2017     Lab Results   Component Value Date    HCT 26.8 11/30/2017     Lab Results   Component Value Date     11/30/2017       Metabolic Panel  Lab Results   Component Value Date     11/29/2017     Lab Results   Component Value Date    POTASSIUM 3.8 11/29/2017     Lab Results   Component Value Date    CHLORIDE 109 11/29/2017     Lab Results   Component Value Date    ANDREIA 9.3 11/29/2017     No components found for: CARBONDIOXIDE  Lab Results   Component Value Date    BUN 45 11/29/2017     Lab Results   Component Value Date    GFRESTIMATED 30 11/29/2017     Lab Results   Component Value Date     11/29/2017       Coagulation  Lab Results   Component Value Date    INR 0.96 11/29/2017     Lab Results   Component Value Date    PTT 28 11/29/2017       =====    Imaging Reviewed:  I have personally reviewed images from PET (11/30/2017).    =====    Review of Systems:  (Positive items in bold. Otherwise negative)    Constitutional symptoms:  Negative for fever/chills, fatigue, night sweats, loss of consciousness, anorexia, myalgia, unexplained weight loss.    Ears, nose, mouth, and throat:  Negative for visual changes, changes in hearing, nose bleeds, tinnitus, trouble swallowing, sore throat, runny nose.    Cardiovascular:  Negative for chest pain, palpitations, dyspnea on exertion, cold extremities.    Respiratory:  Negative for cough, hemoptysis, shortness of breath, pleuritic pain.    Gastrointestinal:  Negative for abdominal pain, nausea/vomiting, constipation, diarrhea, melena, hematochezia.    Genitourinary:  Negative for dysuria,  polyuria, hematuria, incontinence.    Musculoskeletal:  Negative for pain, limited range of motion, joint swelling, functional deficit, arthritis.    Integumentary:  Negative for rashes, lesions, pruritus, wounds, nodules, tumors, discoloration.    Neurological:  Negative for functional deficit, headache, paresthesias, numbness (lateral and inferior right foot, attributed to chemotherapy), weakness, trouble with gait, dizziness, pain.    Endocrine:  Negative for heat or cold intolerance, polydipsia, polyuria.    Psychiatric: Negative for hallucinations, anxiety, depression.    =====    Physical Exam:     General:  No apparent distress, alert, oriented, afebrile, vital signs within normal limits.    Head, ears, nose, mouth, and throat:  Pupils equally round and anicteric. Head atraumatic/normocephalic, oropharynx clear, mucous membranes moist, neck supple.    Cardiovascular:  S1/S2, regular rate and rhythm, no murmur, rubs, or gallops appreciated.    Respiratory:  Lungs clear to auscultation bilaterally.    Gastrointestinal:  Abdomen is soft, nontender, and nondistended.     Musculoskeletal:  Right chest port site is non-tender. Moves all extremities well. Upper extremity ROM grossly intact bilaterally. Neck ROM grossly intact.    Integumentary:  No erythema, swelling, rash, or lesions appreciated.    Neurological:  Motor and sensory function grossly intact. Gait intact.    =====    RISKS OF THE PROCEDURE:  The risks of the procedure were discussed, including;  infection, bleeding, and unintentional damage. The risk of venous thrombus post catheter placement was discussed.    CONSENT:  The patient understood the limitations, alternatives, and risks of the procedure and agreed to the procedure.     SEDATION CONSENT:  It was explained to the patient that medications used for sedation and pain relief may be administered, if needed, to reduce anxiety and discomfort that may be associated with the procedure.  Serious side  effects from the medications are rare but may include nausea, temporary altered mental status, temporary amnesia, or respiratory depression.    =====    PRE-PROCEDURE ASSESSMENT:    Ability to open mouth and stick out tongue:  Yes.    Mallampati score:    o Mallampati II: two things not visible: the anterior and posterior pillars. (Any range of laryngoscopic difficulty.)       Sedation plan:  Moderate sedation, utilizing midazolam and fentanyl    Classification of physical status, ASA score:    o P3 = A patient with severe systemic disease.       I have reviewed the patient data and determined they are a candidate for sedation.    =====    PATIENT INSTRUCTIONS AND GUIDELINES:    If you are taking the medicine Lovenox (enoxaparin), do not take this medicine 24 hours prior to your scheduled procedure. If you are taking twice daily hold the evening dose prior to the procedure as well as the morning dose the day of the procedure.    If you are taking the medicine Coumadin (warfarin), stop taking the medicine 5 days prior to your scheduled procedure.    If you are taking a medicine classified as an  anti-platelet inhibitors  (not including aspirin) stop taking the medicine 5 days prior to your scheduled procedure.    If you are taking the medicine Aspirin or Ibuprofen stop taking the medicine 5 days prior to your scheduled procedure.     No solid foods or milk products for 6 hours prior to the procedure.    You may have clear liquids up to 2 hours prior to the procedure.     Antibacterial scrub; 2 times the day before the procedure and once the day of your procedure. Clean mid chest to earlobes, both sides of the neck and chest. Place scrub on wet wash cloth, scrub on and leave for 5 minutes. Wash off with a clean wash cloth and pat dry skin, or shower.    If you or having your procedure in interventional radiology (IR); arrive in the Gold Waiting room at your scheduled arrival time on the day of your procedure. This is  on the second floor of the hospital, located down the gonzales from the main hospital lobby.    =====    ASSESSMENT AND PLAN:  Type of catheter:  Large bore double lumen tunneled apheresis capable catheter   Preferred Location:  Left internal jugular vein     Patient is a pleasant 62 year old lady with a history of mantle cell lymphoma, with existing right IJ central venous chest port. Patient's team is requesting large bore tunneled central venous catheter placement for autologous stem cell transplant.    I have reviewed the patient data and determined that the requested procedure is indicated, technically achievable, and that the patient is a candidate for moderate sedation. The patient is without signs or symptoms of infection at this time.    Proceed with scheduling for image guided placement of left IJ, 14.5 Fr. Double lumen tunneled central venous catheter in interventional radiology at the [facility] - May schedule in outpatient ASC ([address] [place] [address]).     Thank you for the consultation and for letting Interventional Radiology help you care for your patient.    Sincerely,    Mauricio Gray PA-C, [facility] [place]  Physician Assistant  Interventional Radiology  [phone] (pager)  [phone] (IR control)    =====    A total of 25 minutes was spent with the patient. Greater than 50% of the time was spent in counseling, education, and coordination of care. All of the patient's questions were answered and concerns were addressed.    CC:  Patient Care Team  YOANDY MONTALVO    Sincerely,    Mauricio Gray PA-C

## 2017-12-05 NOTE — MR AVS SNAPSHOT
After Visit Summary   12/5/2017    Julee Ralph    MRN: [id number]           Patient Information     Date Of Birth          1955        Visit Information        Provider Department      12/5/2017 3:00 PM Mauricio Gray PA-C [facility] Interventional Radiology        Today's Diagnoses     Non-Hodgkin's lymphoma (H)        Personal history of diseases of blood and blood-forming organs           Follow-ups after your visit        Who to contact     Please call your clinic at [phone] to:    Ask questions about your health    Make or cancel appointments    Discuss your medicines    Learn about your test results    Speak to your doctor   If you have compliments or concerns about an experience at your clinic, or if you wish to file a complaint, please contact [facility] Patient Relations at [phone] or email us at [email or web address]         Additional Information About Your Visit        MyChart Information     [employer] gives you secure access to your electronic health record. If you see a primary care provider, you can also send messages to your care team and make appointments. If you have questions, please call your primary care clinic.  If you do not have a primary care provider, please call [phone] and they will assist you.      [employer] is an electronic gateway that provides easy, online access to your medical records. With [employer], you can request a clinic appointment, read your test results, renew a prescription or communicate with your care team.     To access your existing account, please contact your [facility] Clinic or call [phone] for assistance.        Care EveryWhere ID     This is your Care EveryWhere ID. This could be used by other organizations to access your [place] medical records  [id number]        Your Vitals Were     Pulse Pulse Oximetry                95 98%           Blood Pressure from Last  3 Encounters:   No data found for BP    Weight from Last 3 Encounters:   No data found for Wt              Today, you had the following     No orders found for display       Primary Care Provider Office Phone # Fax #    Rebekah Arevalo [phone]       [facility] [address] [place] [address]        Equal Access to Services     [facility]: Hadii aad ku hadasho Soomaali, waaxda luqadaha, qaybta kaalmada adeegyada, waxay idiin hayaan adeeg kharash la'aan . So [facility] [phone].    ATENCIÓN: Si habla español, tiene a dexter disposición servicios gratuitos de asistencia lingüística. Llame al [phone].    We comply with applicable federal civil rights laws and [place] laws. We do not discriminate on the basis of race, color, national origin, age, disability, sex, sexual orientation, or gender identity.            Thank you!     Thank you for choosing [facility] INTERVENTIONAL RADIOLOGY  for your care. Our goal is always to provide you with excellent care. Hearing back from our patients is one way we can continue to improve our services. Please take a few minutes to complete the written survey that you may receive in the mail after your visit with us. Thank you!             Your Updated Medication List - Protect others around you: Learn how to safely use, store and throw away your medicines at www.disposemymeds.org.          This list is accurate as of: 12/5/17 11:59 PM.  Always use your most recent med list.                   Brand Name Dispense Instructions for use Diagnosis    ALLOPURINOL PO      Take 300 mg by mouth daily        LIPITOR PO      Take 10 mg by mouth every evening        loratadine 10 MG tablet    CLARITIN     Take 10 mg by mouth daily        LOSARTAN POTASSIUM PO      Take 12.5 mg by mouth daily        Multi-vitamin Tabs tablet      Take 1 tablet by mouth daily as needed        VITAMIN D (CHOLECALCIFEROL) PO      Take 2,000 Units by mouth daily

## 2017-12-06 ENCOUNTER — OFFICE VISIT (OUTPATIENT)
Dept: TRANSPLANT | Facility: CLINIC | Age: 62
End: 2017-12-06
Attending: INTERNAL MEDICINE
Payer: COMMERCIAL

## 2017-12-06 VITALS
DIASTOLIC BLOOD PRESSURE: 85 MMHG | WEIGHT: 167.5 LBS | TEMPERATURE: 97.6 F | OXYGEN SATURATION: 100 % | SYSTOLIC BLOOD PRESSURE: 132 MMHG | HEART RATE: 86 BPM | BODY MASS INDEX: 30.64 KG/M2 | RESPIRATION RATE: 16 BRPM

## 2017-12-06 DIAGNOSIS — C85.90 NON-HODGKIN'S LYMPHOMA (H): ICD-10-CM

## 2017-12-06 DIAGNOSIS — C83.18 LYMPHOMA, MANTLE CELL, MULTIPLE SITES (H): Primary | ICD-10-CM

## 2017-12-06 DIAGNOSIS — Z86.2 PERSONAL HISTORY OF DISEASES OF BLOOD AND BLOOD-FORMING ORGANS: ICD-10-CM

## 2017-12-06 LAB
ANION GAP SERPL CALCULATED.3IONS-SCNC: 8 MMOL/L (ref 3–14)
BUN SERPL-MCNC: 37 MG/DL (ref 7–30)
CALCIUM SERPL-MCNC: 9.4 MG/DL (ref 8.5–10.1)
CHLORIDE SERPL-SCNC: 106 MMOL/L (ref 94–109)
CO2 SERPL-SCNC: 25 MMOL/L (ref 20–32)
COLLECT DURATION TIME UR: 24 H
CREAT 24H UR-MRATE: 0.93 G/(24.H) (ref 0.8–1.8)
CREAT CL 24H UR+SERPL-VRATE: 38 ML/MIN
CREAT CL/1.73 SQ M 24H UR+SERPL-ARVRAT: 37 ML/MIN/1.7M2 (ref 100–160)
CREAT SERPL-MCNC: 1.69 MG/DL (ref 0.52–1.04)
CREAT SERPL-MCNC: 1.69 MG/DL (ref 0.52–1.04)
CREAT UR-MCNC: 80 MG/DL
GFR SERPL CREATININE-BSD FRML MDRD: 30 ML/MIN/1.7M2
GLUCOSE SERPL-MCNC: 84 MG/DL (ref 70–99)
HEIGHT IN CM: 157 CM
POTASSIUM SERPL-SCNC: 4.2 MMOL/L (ref 3.4–5.3)
PROT 24H UR-MRATE: 0.12 G/(24.H) (ref 0.04–0.23)
PROT UR-MCNC: 0.1 G/L
PROT/CREAT 24H UR: 0.13 G/G CR (ref 0–0.2)
SODIUM SERPL-SCNC: 139 MMOL/L (ref 133–144)
SPECIMEN VOL UR: 1150 ML

## 2017-12-06 PROCEDURE — 99212 OFFICE O/P EST SF 10 MIN: CPT | Mod: ZF

## 2017-12-06 PROCEDURE — 80048 BASIC METABOLIC PNL TOTAL CA: CPT | Performed by: INTERNAL MEDICINE

## 2017-12-06 ASSESSMENT — PAIN SCALES - GENERAL: PAINLEVEL: NO PAIN (0)

## 2017-12-06 NOTE — NURSING NOTE
"Oncology Rooming Note    December 6, 2017 12:20 PM   Julee Ralph is a 62 year old female who presents for:    Chief Complaint   Patient presents with     RECHECK     SANTOS CLOSE- MCL     Initial Vitals: /85  Pulse 86  Temp 97.6  F (36.4  C) (Oral)  Resp 16  Wt 76 kg (167 lb 8 oz)  SpO2 100%  BMI 30.64 kg/m2 Estimated body mass index is 30.64 kg/(m^2) as calculated from the following:    Height as of 11/29/17: 1.575 m (5' 2\").    Weight as of this encounter: 76 kg (167 lb 8 oz). Body surface area is 1.82 meters squared.  No Pain (0) Comment: Data Unavailable   No LMP recorded. Patient has had a hysterectomy.  Allergies reviewed: Yes  Medications reviewed: Yes    Medications: Medication refills not needed today.  Pharmacy name entered into [employer]: [facility] [address] [place] [address] [place] AT [facility] [address]    Clinical concerns: n/a     5 minutes for nursing intake (face to face time)     KIKI GALEANO CMA              "

## 2017-12-06 NOTE — LETTER
12/6/2017      RE: Julee Ralph  [address]  [facility] [address]       BMT Clinic Note    Hem/onc History:   Ms. Ralph is a 62 o woman with a history of stage II blastoid-variant mantle cell lymphoma, s/p Tx with 6 cycles of alternating R-CHOP and R-DHAP followed by CR, who presents now for consideration of autologous SCT.     1.)  Patient had been in her usual state of health until She was referred to to be evaluated by ENT and was found to have enlarged tonsils for which she underwent tonsillectomy. No constitutional symptoms, no fatigue.  With outside hospital pathology revealing mantle cell lymphoma/blastoid variant (over 90% Ki67 index).  4/11/17 CT revealed grade 3 hypertrophy of the right tonsil and grade 4 hypertrophy of the left tonsil with multiple enlarged cervical lymp nodes bilaterally; Surgical pathology 05/04/17 revealed blastic mantle cell bilaterally; IHC from her initial biopsy demonstrated strong expression of CD20, cyclin D1; Ki-67 index over 95%; TP53 and c-myc were reportedly negative.Initial labs 05/18/17: WBC 7.0, Hgb 14.5, Plt 340, , Uric acid 2.0, B2MG 2.59, EBV VCA IgG + NAG IgG positive; IgM negative; HBV negative.   PET/CT 05/24/17: soft tissue thickening in the nasopharynx/pre-cervical space with increased metabolic activity and SUV max of 10.2.  Bilateral cervical LNs in levels 2,3,4.  No hypermetabolic activity in the chest, abdomen,pelvis.    BMBx 05/26/17: Normocellular bone marrow with 40% cellularity; normal cytogenetics.  The patient underwent diagnostic LP on 5/26/2017 was no history evidence of involvement of her CNS by mantle cell lymphoma.  TTE 05/31/17: LVEF 77%, no RWMAs.   2.) Evaluated by Dr. Santamaria 07/06/17 for consideration of autologous stem cell transplantation.    3.) The patient started therapy with R-CHOP alternating with R-DHAP.  C1D1 was 06/01/2017.  C2 complicated by grade 1 sinus bradycardia, grade 2 hyperuricemia treated with rasburicase.   Cycle 3 and 4 without complication (C4D1 08/04/17).  Cycle 5 delayed due to neutropenic fever.  Cycle #5 09/08/17; C #6 09/29/17.  Anemic during C6, catalino Hgb 6.2.    4.) PET/CTs 08/01/2017 and 10/17/2017 showed interval resolution of all hypermetabolic regions.      Interval history:   Feeling well as this time, no focal or constitutional symptoms following completion of Tx recently.  Port site without complications.    Denies  fevers, chills, NS, HA, dysphagia/odynophagia, change in weight, change in appetite, cough, SOB, CP, n/v, abd pain, constipation/diarrhea, hematochezia, dysuria, hematuria, swelling, rashes, lymphadenopathy  Past Medical History:   Past medical history:  -Hypertension  -Dyslipidemia  -Obesity  -Mantle cell lymphomaI saw and examined Ms. Ralph today as part of her pre-transplant evaluation for an autologous stem cell transplant.  The patient is a 62-year old with a history of stage II blastoid mantle cell lymphoma with a high Ki-67.  She has now completed 6 cycles of alternating of R-CHOP and R-DHAP, and achieved a complete remission.  She last had chemotherapy sometime in late September.  The patient was seen in consultation, and was now referred back to transplantation.  Her workup was completed.      Currently, the patient has no significant complaints.  She is eating well, and has good energy.  She noticed no pain or new adenopathy.  She has no cough, shortness of breath or fevers.  She has a Port-A-Cath that has not been bothering her.  Otherwise, she feels fine.      On her remaining complete review of systems, there are no additional findings.      The pre-transplant evaluation demonstrated a PET scan with no evidence of persistence of the disease.  Her MUGA had an ejection fraction of 52.6%.  Her pulmonary function test shows an FEV1 of 92% of predicted and a corrected DLCO of 87% predicted.  Her laboratory workup shows a creatinine of 1.69 with an estimated and measured creatinine  clearance of 30.  Other electrolytes are normal.  Her liver function tests showed an AST of 27, AST of 21, and an alkaline phosphatase of 89 with a bilirubin of 23.  The patient is CMV positive, EBV positive, HSV positive.  Her blood counts today on workup showed a total white cell count of 3.2 with a hemoglobin of 9, and platelets 136 with 1.4 neutrophils.      PHYSICAL EXAMINATION:  On exam today, the patient is alert, oriented, and in no acute distress.  He is not pale or jaundiced, and she did not have any palpable lymphadenopathy.  Her buccal mucosa is moist and intact, and her teeth are in good condition.  There is no drainage from the nasopharynx.  Her lungs are clear bilaterally, no crackles or wheezes.  The heart has a regular rhythm and rate with no gallop, rub or murmur.  The port-a-cath in the right chest is clean with no evidence of infections.  The abdomen is flat and soft with a scar from a previous hysterectomy.  Otherwise, there was no evident organomegaly or masses.  Bowel sounds are present.  There is no rebound or guarding.  The skin has no rashes, and the patient has no edema.  Pulses are full and symmetrical in the extremities.  There was no extremity edema.  The extremities are warm and well perfused.  The patient is grossly neurologically intact.      ASSESSMENT AND RECOMMENDATIONS:  Ms. Ralph is a 62-year-old lady with high-risk mantle cell lymphoma, originally stage II, with a Ki-67 of 95%.  She has completed induction chemotherapy, and has achieved a complete remission as demonstrated by the pre-transplant workup.  Unfortunately, her creatinine clearance is too low; therefore, she is ineligible to proceed to transplant at this time.  Patients require a GFR of at least 50 mL/minute to be eligible because nephrotoxic medications and preservatives are used during autologous transplantation.  Patients that start with a lower GFR have a high risk of renal complications, including dialysis.       I had met with the patient and her , and had explained to them all the procedure with its prep, including its most significant risks and potential benefits.  We talked about the risk of infections, organ damage and relapse.  We also talked about maintenance therapy, and the most commonly used supportive measures including, but not limited to, growth factor, blood products, IV fluids and IV nutrition.      We also reviewed the long-term outcomes, in which we expect a 95-97% survival in the first 3-6 months post-transplantation.  Long-term, mantle cell lymphoma has a tendency for relapse.  Data supports the continuation with maintenance rituximab post-transplantation.      In face of the patient's kidney function, which is likely related to the cisplatin she received as part of her chemotherapy, she is not eligible to proceed to autologous transplantation at this time.  I discussed with Dr. Tovar, the patient's primary hematologist, and we both agree that some form of consolidation/maintenance therapy is warranted until the patient's creatinine allows her to go to transplantation.  Dr. Tovar will see the patient in his clinic next week to evaluate and discuss alternatives with her.  We discussed the possibility of radiation consolidation to the neck.  We talked about the possibility of rituximab every 2 months with or without radiation consolidation to her neck.      I called and spoke to the patient on 12/07/2017, and discussed with her this additional information, and my discussions with Dr. Tovar.  She showed good understanding, and, while understandably disappointed, she will return to [place] and meet with Dr. Tovar next week.  She understands that Dr. Tovar and I will be in contact, and once she is doing better we will reconsider her for autologous transplantation.      I spent 50 minutes face-to-face with the patient, with more than 50% dedicated to counseling the patient about her disease and  treatment options.           Past Surgical History:   Past surgical history:  -Hysterectomy  -Tonsillectomy    Social History:   Social history:  -Former smoker (smoked over half a pack per day for 30 years and quit about eight years ago).  -Social alcohol use  -Worsening office as   -No children  2 siblings (two sisters in their 60s one of whom has history of lupus and rheumatoid arthritis).      Family History:   No history of lymhoma        Dwayne Cuba MD

## 2017-12-06 NOTE — MR AVS SNAPSHOT
After Visit Summary   12/6/2017    Julee Ralph    MRN: [id number]           Patient Information     Date Of Birth          1955        Visit Information        Provider Department      12/6/2017 12:00 PM Dwayne Cuba MD [facility] Blood and Marrow Transplant        Today's Diagnoses     Lymphoma, mantle cell, multiple sites (H)    -  1    Personal history of diseases of blood and blood-forming organs              [facility] [place]  [address] [place] [id number]  Phone: [phone]  Clinic Hours:   Monday-Thursday:7am to 7pm   Friday: 7am to 5pm   Weekends and holidays:    8am to noon (in general)  If your fever is 100.5  or greater,   call the clinic.  After hours call the   hospital at [phone] or   [phone]. Ask for the BMT   fellow on-call            Follow-ups after your visit        Who to contact     If you have questions or need follow up information about today's clinic visit or your schedule please contact [facility] BLOOD AND MARROW TRANSPLANT directly at [phone].  Normal or non-critical lab and imaging results will be communicated to you by reQwiphart, letter or phone within 4 business days after the clinic has received the results. If you do not hear from us within 7 days, please contact the clinic through [employer] or phone. If you have a critical or abnormal lab result, we will notify you by phone as soon as possible.  Submit refill requests through "[employer] or call your pharmacy and they will forward the refill request to us. Please allow 3 business days for your refill to be completed.          Additional Information About Your Visit        reQwiphart Information     "[employer] gives you secure access to your electronic health record. If you see a primary care provider, you can also send messages to your care team and make appointments. If you have questions, please call your primary care clinic.  If you do not have a primary care  provider, please call [phone] and they will assist you.        Care EveryWhere ID     This is your Care EveryWhere ID. This could be used by other organizations to access your [place] medical records  [id number]        Your Vitals Were     Pulse Temperature Respirations Pulse Oximetry BMI (Body Mass Index)       86 97.6  F (36.4  C) (Oral) 16 100% 30.64 kg/m2        Blood Pressure from Last 3 Encounters:   12/06/17 132/85   12/05/17 130/81   12/01/17 119/70    Weight from Last 3 Encounters:   12/06/17 76 kg (167 lb 8 oz)   12/01/17 75.8 kg (167 lb 1.7 oz)   11/29/17 76.5 kg (168 lb 11.2 oz)              We Performed the Following     Basic metabolic panel     Creatinine Clearance     Creatinine timed urine     Free kappa and lambda light chains urine     History and physical by physician     Protein electrophoresis timed urine     Protein immunofixation urine     Protein timed urine with Creat Ratio        Recent Review Flowsheet Data     BMT Recent Results Latest Ref Rng & Units 11/29/2017 11/30/2017 12/5/2017 12/6/2017    WBC 4.0 - 11.0 10e9/L 3.6(L) 3.2(L) - -    Hemoglobin 11.7 - 15.7 g/dL 9.3(L) 9.0(L) - -    Platelet Count 150 - 450 10e9/L 147(L) 136(L) - -    Neutrophils (Absolute) 1.6 - 8.3 10e9/L 1.6 1.4(L) - -    INR 0.86 - 1.14 0.96 - - -    Sodium 133 - 144 mmol/L 142 - - 139    Potassium 3.4 - 5.3 mmol/L 3.8 - - 4.2    Chloride 94 - 109 mmol/L 109 - - 106    Glucose 70 - 99 mg/dL 104(H) 94 - 84    Urea Nitrogen 7 - 30 mg/dL 45(H) - - 37(H)    Creatinine 0.52 - 1.04 mg/dL 1.69(H) - 1.69(H) 1.69(H)    Calcium (Total) 8.5 - 10.1 mg/dL 9.3 - - 9.4    Protein (Total) 6.8 - 8.8 g/dL 6.6(L) - - -    Albumin 3.4 - 5.0 g/dL 3.6 - - -    Alkaline Phosphatase 40 - 150 U/L 89 - - -    AST 0 - 45 U/L 21 - - -    ALT 0 - 50 U/L 27 - - -    MCV 78 - 100 fl 103(H) 101(H) - -               Primary Care Provider Office Phone # Fax #    Rebekah Arevalo [phone]       [facility]  [address]  [place] [address]        Equal Access to Services     LUCHOCAMPBELL JOSE : Hadii rin landa hadradha Soaxelali, walethada luqadaha, qajerta karadhada harlantariqsolange, dallas walton blayneandressa parrgenoolivier collado . So [facility] [place] [phone].    ATENCIÓN: Si habla español, tiene a dexter disposición servicios gratuitos de asistencia lingüística. Llame al [phone].    We comply with applicable federal civil rights laws and [place] laws. We do not discriminate on the basis of race, color, national origin, age, disability, sex, sexual orientation, or gender identity.            Thank you!     Thank you for choosing [facility] BLOOD AND MARROW TRANSPLANT  for your care. Our goal is always to provide you with excellent care. Hearing back from our patients is one way we can continue to improve our services. Please take a few minutes to complete the written survey that you may receive in the mail after your visit with us. Thank you!             Your Updated Medication List - Protect others around you: Learn how to safely use, store and throw away your medicines at www.disposemymeds.org.          This list is accurate as of: 12/6/17 11:59 PM.  Always use your most recent med list.                   Brand Name Dispense Instructions for use Diagnosis    ALLOPURINOL PO      Take 300 mg by mouth daily        LIPITOR PO      Take 10 mg by mouth every evening        loratadine 10 MG tablet    CLARITIN     Take 10 mg by mouth daily        LOSARTAN POTASSIUM PO      Take 12.5 mg by mouth daily        Multi-vitamin Tabs tablet      Take 1 tablet by mouth daily as needed        VITAMIN D (CHOLECALCIFEROL) PO      Take 2,000 Units by mouth daily

## 2017-12-06 NOTE — MR AVS SNAPSHOT
After Visit Summary   12/6/2017    Julee Ralph    MRN: [id number]           Patient Information     Date Of Birth          1955        Visit Information        Provider Department      12/6/2017 11:30 AM D, [facility] Bmt Pharm, RPH;  2 119 CONSULT [facility] Blood and Marrow Transplant        Today's Diagnoses     Non-Hodgkin's lymphoma (H)        Personal history of diseases of blood and blood-forming organs              [facility]  [address] [place] [address]  Phone: [phone]  Clinic Hours:   Monday-Thursday:7am to 7pm   Friday: 7am to 5pm   Weekends and holidays:    8am to noon (in general)  If your fever is 100.5  or greater,   call the clinic.  After hours call the   hospital at [phone] or   [phone]. Ask for the BMT   fellow on-call            Follow-ups after your visit        Future tests that were ordered for you today     Open Future Orders        Priority Expected Expires Ordered    Creatinine timed urine Routine 12/6/2017 1/5/2018 12/5/2017    Protein immunofixation urine Routine 12/6/2017 12/5/2018 12/5/2017    Creatinine Clearance Routine 12/6/2017 12/5/2018 12/5/2017    Protein timed urine with Creat Ratio Routine 12/6/2017 12/5/2018 12/5/2017    Protein electrophoresis timed urine Routine 12/6/2017 12/5/2018 12/5/2017    Free kappa and lambda light chains urine Routine 12/6/2017 12/5/2018 12/5/2017            Who to contact     If you have questions or need follow up information about today's clinic visit or your schedule please contact [facility] BLOOD AND MARROW TRANSPLANT directly at [phone].  Normal or non-critical lab and imaging results will be communicated to you by MyChart, letter or phone within 4 business days after the clinic has received the results. If you do not hear from us within 7 days, please contact the clinic through MyChart or phone. If you have a critical or abnormal lab result, we will notify you by phone  as soon as possible.  Submit refill requests through [employer] or call your pharmacy and they will forward the refill request to us. Please allow 3 business days for your refill to be completed.          Additional Information About Your Visit        [employer] Information     [employer] gives you secure access to your electronic health record. If you see a primary care provider, you can also send messages to your care team and make appointments. If you have questions, please call your primary care clinic.  If you do not have a primary care provider, please call [phone] and they will assist you.        Care EveryWhere ID     This is your Care EveryWhere ID. This could be used by other organizations to access your [place] medical records  [id number]         Blood Pressure from Last 3 Encounters:   12/06/17 132/85   12/05/17 130/81   12/01/17 119/70    Weight from Last 3 Encounters:   12/06/17 76 kg (167 lb 8 oz)   12/01/17 75.8 kg (167 lb 1.7 oz)   11/29/17 76.5 kg (168 lb 11.2 oz)              We Performed the Following     Consult with PharmD        Recent Review Flowsheet Data     BMT Recent Results Latest Ref Rng & Units 11/29/2017 11/30/2017    WBC 4.0 - 11.0 10e9/L 3.6(L) 3.2(L)    Hemoglobin 11.7 - 15.7 g/dL 9.3(L) 9.0(L)    Platelet Count 150 - 450 10e9/L 147(L) 136(L)    Neutrophils (Absolute) 1.6 - 8.3 10e9/L 1.6 1.4(L)    INR 0.86 - 1.14 0.96 -    Sodium 133 - 144 mmol/L 142 -    Potassium 3.4 - 5.3 mmol/L 3.8 -    Chloride 94 - 109 mmol/L 109 -    Glucose 70 - 99 mg/dL 104(H) 94    Urea Nitrogen 7 - 30 mg/dL 45(H) -    Creatinine 0.52 - 1.04 mg/dL 1.69(H) -    Calcium (Total) 8.5 - 10.1 mg/dL 9.3 -    Protein (Total) 6.8 - 8.8 g/dL 6.6(L) -    Albumin 3.4 - 5.0 g/dL 3.6 -    Alkaline Phosphatase 40 - 150 U/L 89 -    AST 0 - 45 U/L 21 -    ALT 0 - 50 U/L 27 -    MCV 78 - 100 fl 103(H) 101(H)               Primary Care Provider Office Phone # Fax #    Rebekah LIVIER Arevalo [phone]         [facility] [address] [place] [address]        Equal Access to Services     CAMPBELL [facility]NN : Hadii aad ku hadmalikao Soaxelali, waaxda luqadaha, qaybta kaalmada adekristida, dallas kirain hayaaandressa brownparisa link laSolaserenity blackburn. So [facility] [phone].    ATENCIÓN: Si habla español, tiene a dexter disposición servicios gratuitos de asistencia lingüística. Llame al [phone].    We comply with applicable federal civil rights laws and [place] laws. We do not discriminate on the basis of race, color, national origin, age, disability, sex, sexual orientation, or gender identity.            Thank you!     Thank you for choosing [facility] BLOOD AND MARROW TRANSPLANT  for your care. Our goal is always to provide you with excellent care. Hearing back from our patients is one way we can continue to improve our services. Please take a few minutes to complete the written survey that you may receive in the mail after your visit with us. Thank you!             Your Updated Medication List - Protect others around you: Learn how to safely use, store and throw away your medicines at www.disposemymeds.org.          This list is accurate as of: 12/6/17 12:27 PM.  Always use your most recent med list.                   Brand Name Dispense Instructions for use Diagnosis    ALLOPURINOL PO      Take 300 mg by mouth daily        LIPITOR PO      Take 10 mg by mouth every evening        loratadine 10 MG tablet    CLARITIN     Take 10 mg by mouth daily        LOSARTAN POTASSIUM PO      Take 12.5 mg by mouth daily        Multi-vitamin Tabs tablet      Take 1 tablet by mouth daily as needed        VITAMIN D (CHOLECALCIFEROL) PO      Take 2,000 Units by mouth daily

## 2017-12-06 NOTE — PROGRESS NOTES
Pharmacy Assessment - Pre-Stem Cell Transplant    Assessments & Recommendations:  1) 24h urine collection pending. Limited literature is available regarding whether or not BEAM should be adjusted for renal insufficiency. Transplant plan TBD, consult clinical pharmacist when final plan and 24h urine is resulted  2) Nausea poorly controlled in the past. Patient would like to avoid ativan. Recommend olanzapine 5mg QHS during chemotherapy and PRN after chemotherapy complete.      History of Present Illness:  Julee Ralph is a 62 year old year old female diagnosed with NHL.  She has been treated with RCHOP alternating with RDHAP.  She is now being work up for autologous Stem Cell Transplant on protocol 2016-11, which utilizes BEAM as a conditioning regimen.    Pertinent labs/tests:  Viral Testing:  CMV(+) / HSV(+) / EBV(+)  Ejection Fraction: 52.6% (11/29)  QTc: 455msec (11/30)    Weights:   Wt Readings from Last 3 Encounters:   12/01/17 75.8 kg (167 lb 1.7 oz)   11/29/17 76.5 kg (168 lb 11.2 oz)   11/08/17 77.6 kg (171 lb 1.6 oz)   Ideal body weight: 50.1 kg (110 lb 7.2 oz)  Adjusted ideal body weight: 60.7 kg (133 lb 12 oz)    Primary BMT Physician: Dr Cuba  BMT RN Coordinator:  Lidia Morales    Past Medical History:  No past medical history on file.    Medication Allergies:  Allergies   Allergen Reactions     Seasonal Allergies      Itchy eyes       Current Medications (pre-admit):  Current Outpatient Prescriptions   Medication Sig Dispense Refill     ALLOPURINOL PO Take 300 mg by mouth daily       VITAMIN D, CHOLECALCIFEROL, PO Take 2,000 Units by mouth daily       loratadine (CLARITIN) 10 MG tablet Take 10 mg by mouth daily       multivitamin, therapeutic with minerals (MULTI-VITAMIN) TABS tablet Take 1 tablet by mouth daily as needed        LOSARTAN POTASSIUM PO Take 12.5 mg by mouth daily       Atorvastatin Calcium (LIPITOR PO) Take 10 mg by mouth every evening         Herbal Medication/Nutritional  Supplements:  denies    Smoking/Past Drug Use:  denies    Nausea/Vomiting, Pain, or other issues:  Nausea poorly controlled in the past. Would like to avoid ativan as this (when given with chemo) caused bradycardia per patient.  No issues with pain    Summary:  I met with Julee Ralph for approximately 30 minutes.  We discussed her current and upcoming transplant medication list including the conditioning chemotherapy and immunosuppression, antiemetics, prophylactic antibiotics and vaccinations. Julee Ralph and her caregiver participated in our conversation and voiced their understanding of the topics discussed. Thank you for allowing me to participate in the care of this patient.    Jeff Nguyen, PharmD

## 2017-12-07 LAB
PROT ELPH PNL UR ELPH: NORMAL
PROT PATTERN UR ELPH-IMP: NORMAL

## 2017-12-07 NOTE — PROGRESS NOTES
BMT Clinic Note    Hem/onc History:   Ms. Ralph is a 62 o woman with a history of stage II blastoid-variant mantle cell lymphoma, s/p Tx with 6 cycles of alternating R-CHOP and R-DHAP followed by CR, who presents now for consideration of autologous SCT.     1.)  Patient had been in her usual state of health until She was referred to to be evaluated by ENT and was found to have enlarged tonsils for which she underwent tonsillectomy. No constitutional symptoms, no fatigue.  With outside hospital pathology revealing mantle cell lymphoma/blastoid variant (over 90% Ki67 index).  4/11/17 CT revealed grade 3 hypertrophy of the right tonsil and grade 4 hypertrophy of the left tonsil with multiple enlarged cervical lymp nodes bilaterally; Surgical pathology 05/04/17 revealed blastic mantle cell bilaterally; IHC from her initial biopsy demonstrated strong expression of CD20, cyclin D1; Ki-67 index over 95%; TP53 and c-myc were reportedly negative.Initial labs 05/18/17: WBC 7.0, Hgb 14.5, Plt 340, , Uric acid 2.0, B2MG 2.59, EBV VCA IgG + NAG IgG positive; IgM negative; HBV negative.   PET/CT 05/24/17: soft tissue thickening in the nasopharynx/pre-cervical space with increased metabolic activity and SUV max of 10.2.  Bilateral cervical LNs in levels 2,3,4.  No hypermetabolic activity in the chest, abdomen,pelvis.    BMBx 05/26/17: Normocellular bone marrow with 40% cellularity; normal cytogenetics.  The patient underwent diagnostic LP on 5/26/2017 was no history evidence of involvement of her CNS by mantle cell lymphoma.  TTE 05/31/17: LVEF 77%, no RWMAs.   2.) Evaluated by Dr. Santamaria 07/06/17 for consideration of autologous stem cell transplantation.    3.) The patient started therapy with R-CHOP alternating with R-DHAP.  C1D1 was 06/01/2017.  C2 complicated by grade 1 sinus bradycardia, grade 2 hyperuricemia treated with rasburicase.  Cycle 3 and 4 without complication (C4D1 08/04/17).  Cycle 5 delayed due to  neutropenic fever.  Cycle #5 09/08/17; C #6 09/29/17.  Anemic during C6, catalino Hgb 6.2.    4.) PET/CTs 08/01/2017 and 10/17/2017 showed interval resolution of all hypermetabolic regions.      Interval history:   Feeling well as this time, no focal or constitutional symptoms following completion of Tx recently.  Port site without complications.    Denies  fevers, chills, NS, HA, dysphagia/odynophagia, change in weight, change in appetite, cough, SOB, CP, n/v, abd pain, constipation/diarrhea, hematochezia, dysuria, hematuria, swelling, rashes, lymphadenopathy  Past Medical History:   Past medical history:  -Hypertension  -Dyslipidemia  -Obesity  -Mantle cell lymphomaI saw and examined Ms. Ralph today as part of her pre-transplant evaluation for an autologous stem cell transplant.  The patient is a 62-year old with a history of stage II blastoid mantle cell lymphoma with a high Ki-67.  She has now completed 6 cycles of alternating of R-CHOP and R-DHAP, and achieved a complete remission.  She last had chemotherapy sometime in late September.  The patient was seen in consultation, and was now referred back to transplantation.  Her workup was completed.      Currently, the patient has no significant complaints.  She is eating well, and has good energy.  She noticed no pain or new adenopathy.  She has no cough, shortness of breath or fevers.  She has a Port-A-Cath that has not been bothering her.  Otherwise, she feels fine.      On her remaining complete review of systems, there are no additional findings.      The pre-transplant evaluation demonstrated a PET scan with no evidence of persistence of the disease.  Her MUGA had an ejection fraction of 52.6%.  Her pulmonary function test shows an FEV1 of 92% of predicted and a corrected DLCO of 87% predicted.  Her laboratory workup shows a creatinine of 1.69 with an estimated and measured creatinine clearance of 30.  Other electrolytes are normal.  Her liver function tests  showed an AST of 27, AST of 21, and an alkaline phosphatase of 89 with a bilirubin of 23.  The patient is CMV positive, EBV positive, HSV positive.  Her blood counts today on workup showed a total white cell count of 3.2 with a hemoglobin of 9, and platelets 136 with 1.4 neutrophils.      PHYSICAL EXAMINATION:  On exam today, the patient is alert, oriented, and in no acute distress.  He is not pale or jaundiced, and she did not have any palpable lymphadenopathy.  Her buccal mucosa is moist and intact, and her teeth are in good condition.  There is no drainage from the nasopharynx.  Her lungs are clear bilaterally, no crackles or wheezes.  The heart has a regular rhythm and rate with no gallop, rub or murmur.  The port-a-cath in the right chest is clean with no evidence of infections.  The abdomen is flat and soft with a scar from a previous hysterectomy.  Otherwise, there was no evident organomegaly or masses.  Bowel sounds are present.  There is no rebound or guarding.  The skin has no rashes, and the patient has no edema.  Pulses are full and symmetrical in the extremities.  There was no extremity edema.  The extremities are warm and well perfused.  The patient is grossly neurologically intact.      ASSESSMENT AND RECOMMENDATIONS:  Ms. Ralph is a 62-year-old lady with high-risk mantle cell lymphoma, originally stage II, with a Ki-67 of 95%.  She has completed induction chemotherapy, and has achieved a complete remission as demonstrated by the pre-transplant workup.  Unfortunately, her creatinine clearance is too low; therefore, she is ineligible to proceed to transplant at this time.  Patients require a GFR of at least 50 mL/minute to be eligible because nephrotoxic medications and preservatives are used during autologous transplantation.  Patients that start with a lower GFR have a high risk of renal complications, including dialysis.      I had met with the patient and her , and had explained to them  all the procedure with its prep, including its most significant risks and potential benefits.  We talked about the risk of infections, organ damage and relapse.  We also talked about maintenance therapy, and the most commonly used supportive measures including, but not limited to, growth factor, blood products, IV fluids and IV nutrition.      We also reviewed the long-term outcomes, in which we expect a 95-97% survival in the first 3-6 months post-transplantation.  Long-term, mantle cell lymphoma has a tendency for relapse.  Data supports the continuation with maintenance rituximab post-transplantation.      In face of the patient's kidney function, which is likely related to the cisplatin she received as part of her chemotherapy, she is not eligible to proceed to autologous transplantation at this time.  I discussed with Dr. Tovar, the patient's primary hematologist, and we both agree that some form of consolidation/maintenance therapy is warranted until the patient's creatinine allows her to go to transplantation.  Dr. Tovar will see the patient in his clinic next week to evaluate and discuss alternatives with her.  We discussed the possibility of radiation consolidation to the neck.  We talked about the possibility of rituximab every 2 months with or without radiation consolidation to her neck.      I called and spoke to the patient on 12/07/2017, and discussed with her this additional information, and my discussions with Dr. Tovar.  She showed good understanding, and, while understandably disappointed, she will return to [place] and meet with Dr. Tovar next week.  She understands that Dr. Tovar and I will be in contact, and once she is doing better we will reconsider her for autologous transplantation.      I spent 50 minutes face-to-face with the patient, with more than 50% dedicated to counseling the patient about her disease and treatment options.           Past Surgical History:   Past surgical  history:  -Hysterectomy  -Tonsillectomy    Social History:   Social history:  -Former smoker (smoked over half a pack per day for 30 years and quit about eight years ago).  -Social alcohol use  -Worsening office as   -No children  2 siblings (two sisters in their 60s one of whom has history of lupus and rheumatoid arthritis).      Family History:   No history of lymhoma

## 2017-12-08 LAB
ALBUMIN UR QL: DETECTED
ALPHA1 GLOB 24H UR QL ELPH: DETECTED
ALPHA2 GLOB UR QL ELPH: DETECTED
B-GLOBULIN UR QL ELPH: DETECTED
COLLECT DURATION TIME SPEC: 24 H
COPATH REPORT: NORMAL
GAMMA GLOB UR QL ELPH: DETECTED
INTERPRETATION UR IFE-IMP: ABNORMAL
KAPPA LC FREE 24H UR-MRATE: 28.41 MG/D
KAPPA LC FREE UR-MCNC: 2.47 MG/DL (ref 0.14–2.42)
KAPPA LC FREE/LAMBDA FREE UR: 13.72 RATIO (ref 2.04–10.37)
LAMBDA LC FREE 24H UR-MRATE: 2.07 MG/D
LAMBDA LC FREE UR-MCNC: 0.18 MG/DL (ref 0.02–0.67)
PROT 24H UR-MRATE: 30 MG/D (ref 10–140)
SPECIMEN VOL ?TM UR: 1150 ML

## 2017-12-13 NOTE — PROGRESS NOTES
BMT Teaching Flowsheet    Julee Ralph is a 62 year old female  Diagnoses of Non-Hodgkin's lymphoma (H) and Personal history of diseases of blood and blood-forming organs were pertinent to this visit.    Teaching Topic: Autologous PBSCT    Person(s) involved in teaching: Patient  Motivation Level  Asks Questions: Yes  Eager to Learn: Yes  Cooperative: Yes  Receptive (willing/able to accept information): Yes  Any cultural factors/Sikhism beliefs that may influence understanding or compliance? No    Patient demonstrates understanding of the following:  - Reason for the appointment, diagnosis and treatment plan: Yes  - Knowledge of proper use of medications and conditions for which they are ordered (with special attention to potential side effects or drug interactions): Yes  - Which situations necessitate calling provider and whom to contact: Yes    Teaching concerns addressed: Reviewed collections and transplant calender, consents, medications, side effects, clinic flow and routine, limitations after transplant, caregiver role after transplant, and when to call.      Proper use and care of (medical equipment, care aids, etc.) NA  Pain management techniques: Yes  Patient instructed on hand hygiene: Yes  How and/when to access community resources: Yes    Infection Control:  Patient demonstrates understanding of the following:  Surgical procedure site care taught NA  Signs and symptoms of infection taught Yes  Wound care taught NA  Central venous catheter care taught NA    Instructional Materials Used/Given: BMT teaching binder    Time spent with patient: 90 minutes.    Specific Concerns: No, explain: patient verbalized understanding of provided material via teach back method. No further questions or concerns at this time.

## 2017-12-15 LAB — COPATH REPORT: NORMAL

## 2018-01-05 LAB — COPATH REPORT: NORMAL

## 2018-01-21 ENCOUNTER — HEALTH MAINTENANCE LETTER (OUTPATIENT)
Age: 63
End: 2018-01-21

## 2018-06-08 LAB
ALT SERPL-CCNC: 28 U/L (ref 18–65)
AST SERPL-CCNC: 19 U/L (ref 10–30)
CREAT SERPL-MCNC: 1.69 MG/DL (ref 0.7–1.2)
GLUCOSE SERPL-MCNC: 91 MG/DL (ref 60–99)
POTASSIUM SERPL-SCNC: 3.5 MEQ/L (ref 3.5–5.1)

## 2018-06-22 ENCOUNTER — TRANSFERRED RECORDS (OUTPATIENT)
Dept: HEALTH INFORMATION MANAGEMENT | Facility: CLINIC | Age: 63
End: 2018-06-22

## 2018-07-27 ENCOUNTER — TRANSFERRED RECORDS (OUTPATIENT)
Dept: HEALTH INFORMATION MANAGEMENT | Facility: CLINIC | Age: 63
End: 2018-07-27

## 2018-08-13 LAB
ALT SERPL-CCNC: 29 U/L (ref 18–65)
AST SERPL-CCNC: 20 U/L (ref 10–30)
CREAT SERPL-MCNC: 2.15 MG/DL (ref 0.7–1.2)
GFR SERPL CREATININE-BSD FRML MDRD: 23 ML/MIN/1.73M2
GLUCOSE SERPL-MCNC: 90 MG/DL (ref 60–99)
POTASSIUM SERPL-SCNC: 4.2 MEQ/L (ref 3.5–5.1)

## 2018-08-22 ENCOUNTER — TRANSFERRED RECORDS (OUTPATIENT)
Dept: HEALTH INFORMATION MANAGEMENT | Facility: CLINIC | Age: 63
End: 2018-08-22

## 2018-10-15 ENCOUNTER — TRANSFERRED RECORDS (OUTPATIENT)
Dept: HEALTH INFORMATION MANAGEMENT | Facility: CLINIC | Age: 63
End: 2018-10-15

## 2018-10-15 LAB
ALT SERPL-CCNC: 26 U/L (ref 18–65)
CREAT SERPL-MCNC: 1.94 MG/DL (ref 0.7–1.2)
EJECTION FRACTION: 77
GFR SERPL CREATININE-BSD FRML MDRD: 29 ML/MIN/1.73M2
POTASSIUM SERPL-SCNC: 4.3 MEQ/L (ref 3.5–5.1)

## 2018-12-11 ENCOUNTER — TRANSFERRED RECORDS (OUTPATIENT)
Dept: HEALTH INFORMATION MANAGEMENT | Facility: CLINIC | Age: 63
End: 2018-12-11

## 2018-12-11 LAB
ALT SERPL-CCNC: 24 U/L (ref 18–65)
AST SERPL-CCNC: 25 U/L (ref 10–30)
CREAT SERPL-MCNC: 1.52 MG/DL (ref 0.7–1.2)
GFR SERPL CREATININE-BSD FRML MDRD: 34 ML/MIN/1.73M2
GLUCOSE SERPL-MCNC: 91 MG/DL (ref 60–99)
POTASSIUM SERPL-SCNC: 4.5 MEQ/L (ref 3.5–5.1)

## 2019-02-27 ENCOUNTER — TRANSFERRED RECORDS (OUTPATIENT)
Dept: HEALTH INFORMATION MANAGEMENT | Facility: CLINIC | Age: 64
End: 2019-02-27

## 2019-03-25 ENCOUNTER — TRANSFERRED RECORDS (OUTPATIENT)
Dept: HEALTH INFORMATION MANAGEMENT | Facility: CLINIC | Age: 64
End: 2019-03-25

## 2019-04-09 ENCOUNTER — TRANSFERRED RECORDS (OUTPATIENT)
Dept: HEALTH INFORMATION MANAGEMENT | Facility: CLINIC | Age: 64
End: 2019-04-09

## 2019-04-09 LAB
ALT SERPL-CCNC: 25 U/L (ref 18–65)
AST SERPL-CCNC: 29 U/L (ref 10–30)
CREAT SERPL-MCNC: 1.41 MG/DL (ref 0.7–1.2)
GFR SERPL CREATININE-BSD FRML MDRD: 37 ML/MIN/1.73M2
GLUCOSE SERPL-MCNC: 96 MG/DL (ref 60–99)
POTASSIUM SERPL-SCNC: 3.9 MEQ/L (ref 3.5–5.1)

## 2020-03-10 ENCOUNTER — HEALTH MAINTENANCE LETTER (OUTPATIENT)
Age: 65
End: 2020-03-10

## 2020-12-27 ENCOUNTER — HEALTH MAINTENANCE LETTER (OUTPATIENT)
Age: 65
End: 2020-12-27

## 2021-04-24 ENCOUNTER — HEALTH MAINTENANCE LETTER (OUTPATIENT)
Age: 66
End: 2021-04-24

## 2021-06-19 ENCOUNTER — HEALTH MAINTENANCE LETTER (OUTPATIENT)
Age: 66
End: 2021-06-19

## 2021-10-09 ENCOUNTER — HEALTH MAINTENANCE LETTER (OUTPATIENT)
Age: 66
End: 2021-10-09

## 2022-05-21 ENCOUNTER — HEALTH MAINTENANCE LETTER (OUTPATIENT)
Age: 67
End: 2022-05-21

## 2022-09-17 ENCOUNTER — HEALTH MAINTENANCE LETTER (OUTPATIENT)
Age: 67
End: 2022-09-17

## 2023-06-03 ENCOUNTER — HEALTH MAINTENANCE LETTER (OUTPATIENT)
Age: 68
End: 2023-06-03

## 2023-07-29 ENCOUNTER — HEALTH MAINTENANCE LETTER (OUTPATIENT)
Age: 68
End: 2023-07-29